# Patient Record
Sex: FEMALE | Race: WHITE
[De-identification: names, ages, dates, MRNs, and addresses within clinical notes are randomized per-mention and may not be internally consistent; named-entity substitution may affect disease eponyms.]

---

## 2017-03-09 ENCOUNTER — HOSPITAL ENCOUNTER (OUTPATIENT)
Dept: HOSPITAL 23 - SRAD | Age: 47
Discharge: HOME | End: 2017-03-09
Payer: COMMERCIAL

## 2017-03-09 DIAGNOSIS — M25.551: Primary | ICD-10-CM

## 2021-06-01 ENCOUNTER — OFFICE VISIT (OUTPATIENT)
Dept: NEUROLOGY | Facility: CLINIC | Age: 51
End: 2021-06-01

## 2021-06-01 VITALS
HEIGHT: 62 IN | SYSTOLIC BLOOD PRESSURE: 136 MMHG | HEART RATE: 90 BPM | OXYGEN SATURATION: 98 % | DIASTOLIC BLOOD PRESSURE: 84 MMHG

## 2021-06-01 DIAGNOSIS — R42 DIZZINESS: Primary | ICD-10-CM

## 2021-06-01 PROCEDURE — 99204 OFFICE O/P NEW MOD 45 MIN: CPT | Performed by: PSYCHIATRY & NEUROLOGY

## 2021-06-01 RX ORDER — PANTOPRAZOLE SODIUM 40 MG/1
40 TABLET, DELAYED RELEASE ORAL DAILY
COMMUNITY
Start: 2021-05-18 | End: 2022-12-06

## 2021-06-01 RX ORDER — TRIAMTERENE AND HYDROCHLOROTHIAZIDE 37.5; 25 MG/1; MG/1
1 CAPSULE ORAL DAILY
COMMUNITY
Start: 2021-05-24

## 2021-06-01 RX ORDER — VENLAFAXINE HYDROCHLORIDE 150 MG/1
225 CAPSULE, EXTENDED RELEASE ORAL DAILY
COMMUNITY
Start: 2021-05-24

## 2021-06-01 RX ORDER — MECLIZINE HYDROCHLORIDE 25 MG/1
TABLET ORAL
COMMUNITY
Start: 2021-05-07 | End: 2022-12-06

## 2021-06-01 RX ORDER — PHENAZOPYRIDINE HYDROCHLORIDE 200 MG/1
TABLET, FILM COATED ORAL
COMMUNITY
Start: 2021-04-29 | End: 2022-12-06

## 2021-06-01 RX ORDER — AMLODIPINE BESYLATE AND BENAZEPRIL HYDROCHLORIDE 10; 20 MG/1; MG/1
1 CAPSULE ORAL DAILY
COMMUNITY
Start: 2021-05-24

## 2021-06-01 RX ORDER — ERGOCALCIFEROL 1.25 MG/1
50000 CAPSULE ORAL WEEKLY
COMMUNITY
Start: 2021-04-21 | End: 2023-04-03

## 2021-06-01 RX ORDER — FAMOTIDINE 40 MG/1
40 TABLET, FILM COATED ORAL 2 TIMES DAILY
COMMUNITY
Start: 2021-04-12 | End: 2022-12-06

## 2021-06-01 RX ORDER — ESTRADIOL AND NORETHINDRONE ACETATE .5; .1 MG/1; MG/1
1 TABLET ORAL DAILY
COMMUNITY
Start: 2021-03-03

## 2021-06-01 RX ORDER — BACLOFEN 10 MG/1
TABLET ORAL
COMMUNITY
Start: 2021-04-21

## 2021-06-01 RX ORDER — ATORVASTATIN CALCIUM 10 MG/1
10 TABLET, FILM COATED ORAL
COMMUNITY
Start: 2021-04-21

## 2021-06-01 RX ORDER — OXYBUTYNIN CHLORIDE 5 MG/1
5 TABLET ORAL 2 TIMES DAILY
COMMUNITY
Start: 2021-05-26 | End: 2022-12-06

## 2021-06-01 NOTE — PROGRESS NOTES
"Chief Complaint   Patient presents with   • Dizziness       Patient ID: Mary Early is a 50 y.o. female.    HPI: I had the pleasure of seeing your patient today.  As you may know she is a 50-year-old female being seen for history of dizziness.  The patient says that she experiencing issues with dizziness couple of months ago.  She says that she was at work at that time and began to experience \"cold chills\".  She said that she felt \"spacey\".  She said that she was going dizzy and off balance.  She noticed that she sway when she walked.  She says that her symptoms with come and go.  She had no loss of vision or double vision.  Her symptoms did appear to worsen with changes in position.  She says that her symptoms seem to worsen if she were lying flat.  She states that she saw her primary care physician who prescribed her meclizine.  She says that this was not very helpful.  She says that sitting down will help her symptoms.  She denies any history of head injuries.  No focal weakness or numbness of her arms or legs.  No double vision or loss of vision.  No slurred speech or trouble swallowing.    The following portions of the patient's history were reviewed and updated as appropriate: allergies, current medications, past family history, past medical history, past social history, past surgical history and problem list.    Review of Systems   Constitutional: Negative for activity change, appetite change and fatigue.   HENT: Negative for tinnitus, trouble swallowing and voice change.    Eyes: Negative for photophobia, pain and visual disturbance.   Respiratory: Negative for chest tightness, shortness of breath and wheezing.    Cardiovascular: Negative for chest pain, palpitations and leg swelling.   Gastrointestinal: Negative for abdominal pain, nausea and vomiting.   Endocrine: Negative for cold intolerance, heat intolerance and polydipsia.   Musculoskeletal: Positive for gait problem (no falls, but balance " issues. ). Negative for back pain and neck pain.   Skin: Negative for color change, rash and wound.   Allergic/Immunologic: Negative for environmental allergies, food allergies and immunocompromised state.   Neurological: Positive for dizziness and light-headedness. Negative for tremors, seizures, syncope, facial asymmetry, speech difficulty, weakness, numbness and headaches.   Hematological: Negative for adenopathy. Does not bruise/bleed easily.   Psychiatric/Behavioral: Negative for agitation, behavioral problems, confusion, decreased concentration, dysphoric mood, hallucinations, self-injury, sleep disturbance and suicidal ideas. The patient is not nervous/anxious and is not hyperactive.       I have reviewed the review of systems above performed by my medical assistant.      Vitals:    06/01/21 1431   BP: 136/84   Pulse: 90   SpO2: 98%       Neurologic Exam     Mental Status   Oriented to person, place, and time.   Registration: recalls 3 of 3 objects. Follows 3 step commands.   Attention: normal. Concentration: normal.   Speech: speech is normal   Level of consciousness: alert  Knowledge: consistent with education (No deficits found.).   Normal comprehension.     Cranial Nerves     CN II   Visual fields full to confrontation.     CN III, IV, VI   Pupils are equal, round, and reactive to light.  Extraocular motions are normal.   CN III: no CN III palsy  CN VI: no CN VI palsy  Nystagmus: none   Diplopia: none    CN V   Facial sensation intact.     CN VII   Facial expression full, symmetric.     CN VIII   CN VIII normal.     CN IX, X   CN IX normal.   CN X normal.     CN XI   CN XI normal.     CN XII   CN XII normal.     Motor Exam   Muscle bulk: normal  Right arm tone: normal  Left arm tone: normal  Right leg tone: normal  Left leg tone: normal    Strength   Right neck flexion: 5/5  Left neck flexion: 5/5  Right neck extension: 5/5  Left neck extension: 5/5  Right deltoid: 5/5  Left deltoid: 5/5  Right biceps:  5/5  Left biceps: 5/5  Right triceps: 5/5  Left triceps: 5/5  Right wrist flexion: 5/5  Left wrist flexion: 5/5  Right wrist extension: 5/5  Left wrist extension: 5/5  Right interossei: 5/5  Left interossei: 5/5  Right abdominals: 5/5  Left abdominals: 5/5  Right iliopsoas: 5/5  Left iliopsoas: 5/5  Right quadriceps: 5/5  Left quadriceps: 5/5  Right hamstrin/5  Left hamstrin/5  Right glutei: 5/5  Left glutei: 5/5  Right anterior tibial: 5/5  Left anterior tibial: 5/5  Right posterior tibial: 5/5  Left posterior tibial: 5/5  Right peroneal: 5/5  Left peroneal: 5/5  Right gastroc: 5/5  Left gastroc: 5/5    Sensory Exam   Light touch normal.   Vibration normal.   Proprioception normal.   Pinprick normal.     Gait, Coordination, and Reflexes     Gait  Gait: normal    Coordination   Romberg: negative    Tremor   Resting tremor: absent  Intention tremor: absent    Reflexes   Right brachioradialis: 2+  Left brachioradialis: 2+  Right biceps: 2+  Left biceps: 2+  Right triceps: 2+  Left triceps: 2+  Right patellar: 2+  Left patellar: 2+  Right achilles: 2+  Left achilles: 2+  Right : 2+  Left : 2+Station is normal.       Physical Exam  Vitals reviewed.   Constitutional:       General: She is not in acute distress.     Appearance: She is well-developed.   HENT:      Head: Normocephalic and atraumatic.   Eyes:      Extraocular Movements: EOM normal.      Pupils: Pupils are equal, round, and reactive to light.   Cardiovascular:      Rate and Rhythm: Normal rate and regular rhythm.      Heart sounds: Normal heart sounds.   Pulmonary:      Effort: Pulmonary effort is normal. No respiratory distress.      Breath sounds: Normal breath sounds.   Abdominal:      General: Bowel sounds are normal. There is no distension.      Palpations: Abdomen is soft.      Tenderness: There is no abdominal tenderness.   Musculoskeletal:         General: No deformity.      Cervical back: Normal range of motion.   Skin:     General:  Skin is warm.      Findings: No rash.   Neurological:      Mental Status: She is oriented to person, place, and time.      Coordination: Romberg Test normal.      Gait: Gait is intact.      Deep Tendon Reflexes:      Reflex Scores:       Tricep reflexes are 2+ on the right side and 2+ on the left side.       Bicep reflexes are 2+ on the right side and 2+ on the left side.       Brachioradialis reflexes are 2+ on the right side and 2+ on the left side.       Patellar reflexes are 2+ on the right side and 2+ on the left side.       Achilles reflexes are 2+ on the right side and 2+ on the left side.  Psychiatric:         Speech: Speech normal.         Judgment: Judgment normal.         Procedures    Assessment/Plan: We are going to refer her to [facility] for vestibular assessment.  I would also like to schedule an MRI of her brain as well as an MRA of her head and neck to rule out vertebrobasilar insufficiency.  Return to clinic after testing.       Diagnoses and all orders for this visit:    1. Dizziness (Primary)  -     MRI Brain With & Without Contrast; Future  -     MRI angiogram head w wo contrast; Future  -     MRI Angiogram Neck With & Without Contrast; Future           Gunner Bates II, MD

## 2021-07-08 ENCOUNTER — TELEPHONE (OUTPATIENT)
Dept: NEUROLOGY | Facility: CLINIC | Age: 51
End: 2021-07-08

## 2021-07-08 DIAGNOSIS — R42 DIZZINESS: Primary | ICD-10-CM

## 2021-07-08 NOTE — TELEPHONE ENCOUNTER
Caller: Mary Early    Relationship: Self    Best call back number: [phone]    What is the medical concern/diagnosis: VESTIBULAR ASSESSMENT    What specialty or service is being requested: HEARING SPECIALIST/AUDIOLOGIST    What is the provider, practice or medical service name: [facility]    What is the office phone number: [phone]    Any additional details: PT CALLED TO CONFIRM MRI'S WERE SCHEDULED FOR TOMORROW; VERIFIED YES. PT ALSO STATES SHE HADN'T HEARD ANYTHING REGARDING HER REFERRAL TO HEARING SPECIALIST. LOOKING IN PT'S CHARGE IT DOESN'T LOOK LIKE THIS REFERRAL HAS BEEN CREATED AND PT WOULD LIKE TO GO FORWARD WITH REFERRAL IN ORDER TO BE SCHEDULED ASAP.      PLEASE REVIEW AND ADVISE

## 2021-07-09 ENCOUNTER — HOSPITAL ENCOUNTER (OUTPATIENT)
Dept: MRI IMAGING | Facility: HOSPITAL | Age: 51
Discharge: HOME | End: 2021-07-09

## 2021-07-09 ENCOUNTER — APPOINTMENT (OUTPATIENT)
Dept: MRI IMAGING | Facility: HOSPITAL | Age: 51
End: 2021-07-09

## 2021-07-09 DIAGNOSIS — R42 DIZZINESS: ICD-10-CM

## 2021-07-09 PROCEDURE — 70544 MR ANGIOGRAPHY HEAD W/O DYE: CPT

## 2021-07-09 PROCEDURE — 70547 MR ANGIOGRAPHY NECK W/O DYE: CPT

## 2021-07-09 PROCEDURE — A9577 INJ MULTIHANCE: HCPCS | Performed by: PSYCHIATRY & NEUROLOGY

## 2021-07-09 PROCEDURE — 0 GADOBENATE DIMEGLUMINE 529 MG/ML SOLUTION: Performed by: PSYCHIATRY & NEUROLOGY

## 2021-07-09 PROCEDURE — 70553 MRI BRAIN STEM W/O & W/DYE: CPT

## 2021-07-09 RX ADMIN — GADOBENATE DIMEGLUMINE 20 ML: 529 INJECTION, SOLUTION INTRAVENOUS at 15:55

## 2022-07-24 ENCOUNTER — INPATIENT HOSPITAL (AMBULATORY)
Dept: URBAN - METROPOLITAN AREA HOSPITAL 107 | Facility: HOSPITAL | Age: 52
End: 2022-07-24

## 2022-07-24 DIAGNOSIS — K21.9 GASTRO-ESOPHAGEAL REFLUX DISEASE WITHOUT ESOPHAGITIS: ICD-10-CM

## 2022-07-24 DIAGNOSIS — R11.2 NAUSEA WITH VOMITING, UNSPECIFIED: ICD-10-CM

## 2022-07-24 DIAGNOSIS — R10.9 UNSPECIFIED ABDOMINAL PAIN: ICD-10-CM

## 2022-07-24 DIAGNOSIS — R50.9 FEVER, UNSPECIFIED: ICD-10-CM

## 2022-07-24 DIAGNOSIS — J18.9 PNEUMONIA, UNSPECIFIED ORGANISM: ICD-10-CM

## 2022-07-24 DIAGNOSIS — R93.3 ABNORMAL FINDINGS ON DIAGNOSTIC IMAGING OF OTHER PARTS OF DI: ICD-10-CM

## 2022-07-24 PROCEDURE — 99223 1ST HOSP IP/OBS HIGH 75: CPT | Performed by: INTERNAL MEDICINE

## 2022-07-25 ENCOUNTER — INPATIENT HOSPITAL (AMBULATORY)
Dept: URBAN - METROPOLITAN AREA HOSPITAL 107 | Facility: HOSPITAL | Age: 52
End: 2022-07-25

## 2022-07-25 DIAGNOSIS — K21.9 GASTRO-ESOPHAGEAL REFLUX DISEASE WITHOUT ESOPHAGITIS: ICD-10-CM

## 2022-07-25 DIAGNOSIS — J18.9 PNEUMONIA, UNSPECIFIED ORGANISM: ICD-10-CM

## 2022-07-25 DIAGNOSIS — R93.3 ABNORMAL FINDINGS ON DIAGNOSTIC IMAGING OF OTHER PARTS OF DI: ICD-10-CM

## 2022-07-25 DIAGNOSIS — R11.2 NAUSEA WITH VOMITING, UNSPECIFIED: ICD-10-CM

## 2022-07-25 PROCEDURE — 99232 SBSQ HOSP IP/OBS MODERATE 35: CPT | Performed by: NURSE PRACTITIONER

## 2022-07-26 ENCOUNTER — INPATIENT HOSPITAL (AMBULATORY)
Dept: URBAN - METROPOLITAN AREA HOSPITAL 107 | Facility: HOSPITAL | Age: 52
End: 2022-07-26

## 2022-07-26 DIAGNOSIS — J18.9 PNEUMONIA, UNSPECIFIED ORGANISM: ICD-10-CM

## 2022-07-26 DIAGNOSIS — R93.3 ABNORMAL FINDINGS ON DIAGNOSTIC IMAGING OF OTHER PARTS OF DI: ICD-10-CM

## 2022-07-26 DIAGNOSIS — R11.10 VOMITING, UNSPECIFIED: ICD-10-CM

## 2022-07-26 DIAGNOSIS — K21.9 GASTRO-ESOPHAGEAL REFLUX DISEASE WITHOUT ESOPHAGITIS: ICD-10-CM

## 2022-07-26 PROCEDURE — 99231 SBSQ HOSP IP/OBS SF/LOW 25: CPT | Performed by: INTERNAL MEDICINE

## 2022-08-30 VITALS
RESPIRATION RATE: 26 BRPM | HEART RATE: 70 BPM | OXYGEN SATURATION: 98 % | TEMPERATURE: 97.9 F | HEART RATE: 77 BPM | SYSTOLIC BLOOD PRESSURE: 102 MMHG | RESPIRATION RATE: 19 BRPM | WEIGHT: 224 LBS | OXYGEN SATURATION: 100 % | HEART RATE: 92 BPM | DIASTOLIC BLOOD PRESSURE: 79 MMHG | OXYGEN SATURATION: 99 % | DIASTOLIC BLOOD PRESSURE: 62 MMHG | SYSTOLIC BLOOD PRESSURE: 104 MMHG | HEART RATE: 80 BPM | RESPIRATION RATE: 18 BRPM | DIASTOLIC BLOOD PRESSURE: 68 MMHG | SYSTOLIC BLOOD PRESSURE: 108 MMHG | DIASTOLIC BLOOD PRESSURE: 54 MMHG | SYSTOLIC BLOOD PRESSURE: 125 MMHG | RESPIRATION RATE: 16 BRPM | HEIGHT: 62 IN | HEART RATE: 76 BPM | SYSTOLIC BLOOD PRESSURE: 105 MMHG | RESPIRATION RATE: 17 BRPM | DIASTOLIC BLOOD PRESSURE: 63 MMHG | TEMPERATURE: 97.1 F | HEART RATE: 89 BPM

## 2022-08-31 PROBLEM — R11.2 NAUSEA WITH VOMITING: Status: ACTIVE | Noted: 2022-09-01

## 2022-08-31 PROBLEM — R11.0 NAUSEA: Status: ACTIVE | Noted: 2022-09-01

## 2022-08-31 PROBLEM — K21.9 GERD - GASTROESOPHAGEAL REFLUX DISEASE: Status: ACTIVE | Noted: 2022-09-01

## 2022-08-31 PROBLEM — R93.3 ABNORMAL FINDINGS ON DIAGNOSTIC IMAGING OF OTHER PARTS OF DIGESTIVE TRACT: Status: ACTIVE | Noted: 2022-09-01

## 2022-09-01 ENCOUNTER — OFFICE (AMBULATORY)
Dept: URBAN - METROPOLITAN AREA PATHOLOGY 4 | Facility: PATHOLOGY | Age: 52
End: 2022-09-01

## 2022-09-01 ENCOUNTER — AMBULATORY SURGICAL CENTER (AMBULATORY)
Dept: URBAN - METROPOLITAN AREA SURGERY 17 | Facility: SURGERY | Age: 52
End: 2022-09-01
Payer: COMMERCIAL

## 2022-09-01 DIAGNOSIS — R93.3 ABNORMAL FINDINGS ON DIAGNOSTIC IMAGING OF OTHER PARTS OF DI: ICD-10-CM

## 2022-09-01 DIAGNOSIS — T18.2XXA FOREIGN BODY IN STOMACH, INITIAL ENCOUNTER: ICD-10-CM

## 2022-09-01 DIAGNOSIS — K22.5 DIVERTICULUM OF ESOPHAGUS, ACQUIRED: ICD-10-CM

## 2022-09-01 DIAGNOSIS — K31.89 OTHER DISEASES OF STOMACH AND DUODENUM: ICD-10-CM

## 2022-09-01 DIAGNOSIS — K21.9 GASTRO-ESOPHAGEAL REFLUX DISEASE WITHOUT ESOPHAGITIS: ICD-10-CM

## 2022-09-01 DIAGNOSIS — K29.70 GASTRITIS, UNSPECIFIED, WITHOUT BLEEDING: ICD-10-CM

## 2022-09-01 DIAGNOSIS — R11.2 NAUSEA WITH VOMITING, UNSPECIFIED: ICD-10-CM

## 2022-09-01 PROBLEM — K22.89 OTHER SPECIFIED DISEASE OF ESOPHAGUS: Status: ACTIVE | Noted: 2022-09-01

## 2022-09-01 LAB
GI HISTOLOGY: A. SELECT: (no result)
GI HISTOLOGY: PDF REPORT: (no result)

## 2022-09-01 PROCEDURE — 88305 TISSUE EXAM BY PATHOLOGIST: CPT | Performed by: INTERNAL MEDICINE

## 2022-09-01 PROCEDURE — 43239 EGD BIOPSY SINGLE/MULTIPLE: CPT | Performed by: INTERNAL MEDICINE

## 2022-09-01 NOTE — SERVICEHPINOTES
51-year-old female with reflux, she was seen her in the hospital, she had bilateral pneumonia.  CT showed a dilated fluid-filled esophagus.  She's had previous lap band.  She presents for EGD.

## 2022-12-06 ENCOUNTER — OFFICE VISIT (OUTPATIENT)
Dept: CARDIOLOGY | Facility: CLINIC | Age: 52
End: 2022-12-06

## 2022-12-06 VITALS
HEART RATE: 99 BPM | DIASTOLIC BLOOD PRESSURE: 90 MMHG | SYSTOLIC BLOOD PRESSURE: 146 MMHG | WEIGHT: 224 LBS | HEIGHT: 62 IN | BODY MASS INDEX: 41.22 KG/M2

## 2022-12-06 DIAGNOSIS — I10 PRIMARY HYPERTENSION: ICD-10-CM

## 2022-12-06 DIAGNOSIS — I31.39 PERICARDIAL EFFUSION: Primary | ICD-10-CM

## 2022-12-06 DIAGNOSIS — R93.89 ABNORMAL CT OF THE CHEST: ICD-10-CM

## 2022-12-06 PROCEDURE — 99203 OFFICE O/P NEW LOW 30 MIN: CPT | Performed by: INTERNAL MEDICINE

## 2022-12-06 PROCEDURE — 93000 ELECTROCARDIOGRAM COMPLETE: CPT | Performed by: INTERNAL MEDICINE

## 2022-12-06 NOTE — PROGRESS NOTES
New Patient   Ref by Dr. Rosa MALAGON on CT    Subjective:        [facility]  Cardiology Consult Note    Patient Identification:  Name: Mary Early  Age: 52 y.o.  Sex: female  :  1970  MRN: [id number]             CC      52-year-old female here for the cardiac evaluation as well as establishment of the care as the patient recently had a CT of the chest which shows small pericardial effusion denies any chest pains or tightness in the chest    History of Present Illness:       Comorbid cardiac risk factors:     Past Medical History:  Past Medical History:   Diagnosis Date   • Anemia    • Arthritis    • Depression    • Headache, tension-type    • Hypertension      Past Surgical History:  Past Surgical History:   Procedure Laterality Date   • BREAST BIOPSY     •  SECTION      1986,1991,1994   • FOOT SURGERY Left    • GALLBLADDER SURGERY     • HYSTERECTOMY     • WRIST ARTHROPLASTY Bilateral       Allergies:  No Known Allergies  Home Meds:  (Not in a hospital admission)    Current Meds:     Current Outpatient Medications:   •  amLODIPine-benazepril (LOTREL 5-20) 5-20 MG per capsule, Take 1 capsule by mouth Daily., Disp: , Rfl:   •  atorvastatin (LIPITOR) 10 MG tablet, Take 10 mg by mouth every night at bedtime., Disp: , Rfl:   •  B Complex-C-E-Zn (b complex-C-E-zinc) tablet, Take 1 tablet by mouth Daily., Disp: , Rfl:   •  baclofen (LIORESAL) 10 MG tablet, TAKE 1 TABLET BY MOUTH THREE TIMES DAILY AS NEEDED FOR MUSCLE SPASMS. MAY CAUSE DROWSINESS, Disp: , Rfl:   •  Estradiol-Norethindrone Acet 0.5-0.1 MG per tablet, Take 1 tablet by mouth Daily., Disp: , Rfl:   •  triamterene-hydrochlorothiazide (DYAZIDE) 37.5-25 MG per capsule, Take 1 capsule by mouth Daily., Disp: , Rfl:   •  venlafaxine XR (EFFEXOR-XR) 150 MG 24 hr capsule, Take 150 mg by mouth Daily., Disp: , Rfl:   •  vitamin D (ERGOCALCIFEROL) 1.25 MG (97455 UT) capsule capsule, Take 50,000 Units by mouth 1  (One) Time Per Week., Disp: , Rfl:   Social History:   Social History     Tobacco Use   • Smoking status: Never   • Smokeless tobacco: Never   Substance Use Topics   • Alcohol use: Yes     Alcohol/week: 0.0 - 1.0 standard drinks      Family History:  Family History   Problem Relation Age of Onset   • Arthritis Mother    • Alcohol abuse Mother    • Heart disease Mother    • Hypertension Mother    • Hyperlipidemia Mother    • Obesity Mother    • Stroke Mother    • Thyroid disease Mother    • Alcohol abuse Father    • Hypertension Father         Review of Systems    Constitutional: No weakness,fatigue, fever, rigors, chills   Eyes: No vision changes, eye pain   ENT/oropharynx: No difficulty swallowing, sore throat, epistaxis, changes in hearing   Cardiovascular: No chest pain, chest tightness, palpitations, paroxysmal nocturnal dyspnea, orthopnea, diaphoresis, dizziness / syncopal episode   Respiratory: No shortness of breath, dyspnea on exertion, cough, wheezing hemoptysis   Gastrointestinal: No abdominal pain, nausea, vomiting, diarrhea, bloody stools   Genitourinary: No hematuria, dysuria   Neurological: No headache, tremors, numbness,  one-sided weakness    Musculoskeletal: No cramps, myalgias,  joint pain, joint swelling   Integument: No rash, edema           Constitutional:  Heart Rate:  [99] 99  BP: (146)/(90) 146/90    Physical Exam   General:  Appears in no acute distress  Eyes: PERTL,  HEENT:  No JVD. Thyroid not visibly enlarged. No mucosal pallor or cyanosis  Respiratory: Respirations regular and unlabored at rest. BBS with good air entry in all fields. No crackles, rubs or wheezes auscultated  Cardiovascular: S1S2 Regular rate and rhythm. No murmur, rub or gallop auscultated. No carotid bruits. DP/PT pulses    . No pretibial pitting edema  Gastrointestinal: Abdomen soft, flat, non tender. Bowel sounds present. No hepatosplenomegaly. No ascites  Musculoskeletal: CAMP x4. No abnormal movements  Extremities:  No digital clubbing or cyanosis  Skin: Color pink. Skin warm and dry to touch. No rashes  No xanthoma  Neuro: AAO x3 CN II-XII grossly intact            ECG 12 Lead    Date/Time: 12/6/2022 2:13 PM  Performed by: Betty Bentley MD  Authorized by: Betty Bentley MD   Comparison: not compared with previous ECG   Previous ECG: no previous ECG available  Rhythm: sinus rhythm    Clinical impression: non-specific ECG                Cardiographics  ECG:     Telemetry:    Echocardiogram:     Imaging  Chest X-ray:     Lab Review               @[email or web address]@              Assessment:/ Recommendations / Plan:   Patient Active Problem List   Diagnosis   • Pericardial effusion   • Abnormal CT of the chest                    ICD-10-CM ICD-9-CM   1. Pericardial effusion  I31.39 423.9   2. Abnormal CT of the chest  R93.89 793.2     1. Pericardial effusion  Considering patient's medical condition as well as the risk factors, patient will require echocardiogram for further evaluation for the LV function, four-chamber evaluation, including the pressures, valvular function and  pericardial disease and pericardial effusion      2. Abnormal CT of the chest  Considering the patient's symptoms as well as clinical situation and  EKG findings, along with cardiac risk factors, ischemic workup is necessary to rule out ischemic cardiomyopathy, stress induced arrhythmias, and functional capacity for diagnosis as well as prognostic consideration       Ct showed pericardial effusion  Htn, hyperlipidemia, obesity  F/h. High stress  Ett, echo      Labs/tests ordered for am      Betty Bentley MD  12/7/2022, 08:46 EST      EMR Dragon/Transcription:   Dictated utilizing Dragon dictation

## 2022-12-07 PROBLEM — I31.39 PERICARDIAL EFFUSION: Status: ACTIVE | Noted: 2022-12-07

## 2022-12-07 PROBLEM — R93.89 ABNORMAL CT OF THE CHEST: Status: ACTIVE | Noted: 2022-12-07

## 2022-12-20 ENCOUNTER — HOSPITAL ENCOUNTER (OUTPATIENT)
Dept: CARDIOLOGY | Facility: HOSPITAL | Age: 52
Discharge: HOME | End: 2022-12-20
Admitting: INTERNAL MEDICINE

## 2022-12-20 VITALS
DIASTOLIC BLOOD PRESSURE: 73 MMHG | HEIGHT: 62 IN | WEIGHT: 224 LBS | HEART RATE: 83 BPM | SYSTOLIC BLOOD PRESSURE: 115 MMHG | BODY MASS INDEX: 41.22 KG/M2

## 2022-12-20 PROCEDURE — 93306 TTE W/DOPPLER COMPLETE: CPT | Performed by: INTERNAL MEDICINE

## 2022-12-20 PROCEDURE — 93306 TTE W/DOPPLER COMPLETE: CPT

## 2022-12-21 LAB
BH CV ECHO MEAS - ACS: 1.94 CM
BH CV ECHO MEAS - AO MAX PG: 8.6 MMHG
BH CV ECHO MEAS - AO MEAN PG: 4.8 MMHG
BH CV ECHO MEAS - AO ROOT DIAM: 2.26 CM
BH CV ECHO MEAS - AO V2 MAX: 146.6 CM/SEC
BH CV ECHO MEAS - AO V2 VTI: 28.6 CM
BH CV ECHO MEAS - AVA(I,D): 2.3 CM2
BH CV ECHO MEAS - EDV(CUBED): 69.5 ML
BH CV ECHO MEAS - EDV(MOD-SP2): 41 ML
BH CV ECHO MEAS - EDV(MOD-SP4): 71 ML
BH CV ECHO MEAS - EF(MOD-BP): 66.4 %
BH CV ECHO MEAS - EF(MOD-SP2): 63.4 %
BH CV ECHO MEAS - EF(MOD-SP4): 67.6 %
BH CV ECHO MEAS - ESV(CUBED): 21.8 ML
BH CV ECHO MEAS - ESV(MOD-SP2): 15 ML
BH CV ECHO MEAS - ESV(MOD-SP4): 23 ML
BH CV ECHO MEAS - FS: 32 %
BH CV ECHO MEAS - IVS/LVPW: 1.17 CM
BH CV ECHO MEAS - IVSD: 1.37 CM
BH CV ECHO MEAS - LA DIMENSION: 3.3 CM
BH CV ECHO MEAS - LAT PEAK E' VEL: 7.3 CM/SEC
BH CV ECHO MEAS - LV DIASTOLIC VOL/BSA (35-75): 35.4 CM2
BH CV ECHO MEAS - LV MASS(C)D: 188.1 GRAMS
BH CV ECHO MEAS - LV MAX PG: 4.7 MMHG
BH CV ECHO MEAS - LV MEAN PG: 1.89 MMHG
BH CV ECHO MEAS - LV SYSTOLIC VOL/BSA (12-30): 11.5 CM2
BH CV ECHO MEAS - LV V1 MAX: 108.2 CM/SEC
BH CV ECHO MEAS - LV V1 VTI: 20.7 CM
BH CV ECHO MEAS - LVIDD: 4.1 CM
BH CV ECHO MEAS - LVIDS: 2.8 CM
BH CV ECHO MEAS - LVOT AREA: 3.2 CM2
BH CV ECHO MEAS - LVOT DIAM: 2.02 CM
BH CV ECHO MEAS - LVPWD: 1.17 CM
BH CV ECHO MEAS - MED PEAK E' VEL: 8.7 CM/SEC
BH CV ECHO MEAS - MV A DUR: 0.1 SEC
BH CV ECHO MEAS - MV A MAX VEL: 59.2 CM/SEC
BH CV ECHO MEAS - MV DEC SLOPE: 251.8 CM/SEC2
BH CV ECHO MEAS - MV DEC TIME: 219 MSEC
BH CV ECHO MEAS - MV E MAX VEL: 62.1 CM/SEC
BH CV ECHO MEAS - MV E/A: 1.05
BH CV ECHO MEAS - MV MAX PG: 2.48 MMHG
BH CV ECHO MEAS - MV MEAN PG: 1.69 MMHG
BH CV ECHO MEAS - MV P1/2T: 52.1 MSEC
BH CV ECHO MEAS - MV V2 VTI: 22.7 CM
BH CV ECHO MEAS - MVA(P1/2T): 4.2 CM2
BH CV ECHO MEAS - MVA(VTI): 2.9 CM2
BH CV ECHO MEAS - PA ACC TIME: 0.08 SEC
BH CV ECHO MEAS - PA PR(ACCEL): 44.1 MMHG
BH CV ECHO MEAS - PA V2 MAX: 127.6 CM/SEC
BH CV ECHO MEAS - PI END-D VEL: 104.5 CM/SEC
BH CV ECHO MEAS - PULM A REVS VEL: 33.3 CM/SEC
BH CV ECHO MEAS - PULM DIAS VEL: 44.8 CM/SEC
BH CV ECHO MEAS - PULM S/D: 1.37
BH CV ECHO MEAS - PULM SYS VEL: 61.4 CM/SEC
BH CV ECHO MEAS - QP/QS: 1.49
BH CV ECHO MEAS - RV MAX PG: 3.5 MMHG
BH CV ECHO MEAS - RV V1 MAX: 93.6 CM/SEC
BH CV ECHO MEAS - RV V1 VTI: 17.6 CM
BH CV ECHO MEAS - RVDD: 3.2 CM
BH CV ECHO MEAS - RVOT DIAM: 2.7 CM
BH CV ECHO MEAS - SI(MOD-SP2): 13 ML/M2
BH CV ECHO MEAS - SI(MOD-SP4): 23.9 ML/M2
BH CV ECHO MEAS - SV(LVOT): 65.9 ML
BH CV ECHO MEAS - SV(MOD-SP2): 26 ML
BH CV ECHO MEAS - SV(MOD-SP4): 48 ML
BH CV ECHO MEAS - SV(RVOT): 98.1 ML
BH CV ECHO MEAS - TAPSE (>1.6): 2.32 CM
BH CV ECHO MEASUREMENTS AVERAGE E/E' RATIO: 7.76
BH CV XLRA - TDI S': 18.7 CM/SEC
MAXIMAL PREDICTED HEART RATE: 166 BPM
SINUS: 2.8 CM
STJ: 3 CM
STRESS TARGET HR: 141 BPM

## 2022-12-29 ENCOUNTER — HOSPITAL ENCOUNTER (OUTPATIENT)
Dept: CARDIOLOGY | Facility: HOSPITAL | Age: 52
Discharge: HOME | End: 2022-12-29
Admitting: INTERNAL MEDICINE

## 2022-12-29 VITALS — HEART RATE: 97 BPM | SYSTOLIC BLOOD PRESSURE: 110 MMHG | DIASTOLIC BLOOD PRESSURE: 60 MMHG

## 2022-12-29 LAB
BH CV STRESS BP STAGE 1: NORMAL
BH CV STRESS DURATION MIN STAGE 1: 3
BH CV STRESS DURATION MIN STAGE 2: 0
BH CV STRESS DURATION SEC STAGE 1: 0
BH CV STRESS DURATION SEC STAGE 2: 53
BH CV STRESS GRADE STAGE 1: 10
BH CV STRESS GRADE STAGE 2: 12
BH CV STRESS HR STAGE 1: 139
BH CV STRESS HR STAGE 2: 145
BH CV STRESS METS STAGE 1: 4.6
BH CV STRESS METS STAGE 2: 5.2
BH CV STRESS PROTOCOL 1: NORMAL
BH CV STRESS RECOVERY BP: NORMAL MMHG
BH CV STRESS RECOVERY HR: 106 BPM
BH CV STRESS SPEED STAGE 1: 1.7
BH CV STRESS SPEED STAGE 2: 2.5
BH CV STRESS STAGE 1: 1
BH CV STRESS STAGE 2: 2
MAXIMAL PREDICTED HEART RATE: 166 BPM
PERCENT MAX PREDICTED HR: 87.35 %
STRESS BASELINE BP: NORMAL MMHG
STRESS BASELINE HR: 98 BPM
STRESS PERCENT HR: 103 %
STRESS POST ESTIMATED WORKLOAD: 5.4 METS
STRESS POST EXERCISE DUR MIN: 3 MIN
STRESS POST EXERCISE DUR SEC: 53 SEC
STRESS POST PEAK BP: NORMAL MMHG
STRESS POST PEAK HR: 145 BPM
STRESS TARGET HR: 141 BPM

## 2022-12-29 PROCEDURE — 93018 CV STRESS TEST I&R ONLY: CPT | Performed by: INTERNAL MEDICINE

## 2022-12-29 PROCEDURE — 93017 CV STRESS TEST TRACING ONLY: CPT

## 2023-02-20 ENCOUNTER — OFFICE VISIT (OUTPATIENT)
Dept: CARDIOLOGY | Facility: CLINIC | Age: 53
End: 2023-02-20
Payer: COMMERCIAL

## 2023-02-20 VITALS
WEIGHT: 234 LBS | BODY MASS INDEX: 43.06 KG/M2 | SYSTOLIC BLOOD PRESSURE: 120 MMHG | HEART RATE: 88 BPM | HEIGHT: 62 IN | DIASTOLIC BLOOD PRESSURE: 74 MMHG

## 2023-02-20 DIAGNOSIS — I10 PRIMARY HYPERTENSION: ICD-10-CM

## 2023-02-20 DIAGNOSIS — I31.39 PERICARDIAL EFFUSION: Primary | ICD-10-CM

## 2023-02-20 PROCEDURE — 99213 OFFICE O/P EST LOW 20 MIN: CPT | Performed by: INTERNAL MEDICINE

## 2023-02-20 RX ORDER — HYDROCODONE BITARTRATE AND ACETAMINOPHEN 5; 325 MG/1; MG/1
1 TABLET ORAL EVERY 6 HOURS PRN
COMMUNITY

## 2023-02-20 NOTE — PROGRESS NOTES
"Stress test and echo results    Subjective:        Mary Early is a 52 y.o. female who here for follow up    CC  Follow-up fusion hypertension  HPI  52-year-old female with a hypertension pericardial effusion here for the follow-up with no complaints of  tightness in the chest     Problems Addressed this Visit        Cardiac and Vasculature    Pericardial effusion - Primary    Primary hypertension   Diagnoses       Codes Comments    Pericardial effusion    -  Primary ICD-10-CM: I31.39  ICD-9-CM: 423.9     Primary hypertension     ICD-10-CM: I10  ICD-9-CM: 401.9         .    The following portions of the patient's history were reviewed and updated as appropriate: allergies, current medications, past family history, past medical history, past social history, past surgical history and problem list.    Past Medical History:   Diagnosis Date   • Anemia    • Arthritis    • Depression    • Headache, tension-type    • Hypertension      reports that she has never smoked. She has never used smokeless tobacco. She reports current alcohol use. She reports that she does not use drugs.   Family History   Problem Relation Age of Onset   • Arthritis Mother    • Alcohol abuse Mother    • Heart disease Mother    • Hypertension Mother    • Hyperlipidemia Mother    • Obesity Mother    • Stroke Mother    • Thyroid disease Mother    • Alcohol abuse Father    • Hypertension Father        Review of Systems  Constitutional: No wt loss, fever, fatigue  Gastrointestinal: No nausea, abdominal pain  Behavioral/Psych: No insomnia or anxiety   Cardiovascular no chest pains or tightness in the chest  Objective:       Physical Exam           Physical Exam  /74   Pulse 88   Ht 157.5 cm (62\")   Wt 106 kg (234 lb)   BMI 42.80 kg/m²     General appearance: No acute changes   Eyes: Sclerae conjunctivae normal, pupils reactive   HENT: Atraumatic; oropharynx clear with moist mucous membranes and no mucosal ulcerations;  Neck: Trachea " midline; NECK, supple, no thyromegaly or lymphadenopathy   Lungs: Normal size and shape, normal breath sounds, equal distribution of air, no rales and rhonchi   CV: S1-S2 regular, no murmurs, no rub, no gallop   Abdomen: Soft, nontender; no masses , no abnormal abdominal sounds   Extremities: No deformity , normal color , no peripheral edema   Skin: Normal temperature, turgor and texture; no rash, ulcers  Psych: Appropriate affect, alert and oriented to person, place and time           Procedures  No ECG evidence of myocardial ischemia. Negative clinical evidence of myocardial ischemia. Findings consistent with a normal ECG stress test. Asymptomatic for chest pain. ECG is negative for ischemia.    Interpretation Summary       •  Left ventricular ejection fraction appears to be 66 - 70%.  •  Left ventricular diastolic function was normal.    Echocardiogram:        Current Outpatient Medications:   •  amLODIPine-benazepril (LOTREL 5-20) 5-20 MG per capsule, Take 1 capsule by mouth Daily., Disp: , Rfl:   •  atorvastatin (LIPITOR) 10 MG tablet, Take 10 mg by mouth every night at bedtime., Disp: , Rfl:   •  B Complex-C-E-Zn (b complex-C-E-zinc) tablet, Take 1 tablet by mouth Daily., Disp: , Rfl:   •  baclofen (LIORESAL) 10 MG tablet, TAKE 1 TABLET BY MOUTH THREE TIMES DAILY AS NEEDED FOR MUSCLE SPASMS. MAY CAUSE DROWSINESS, Disp: , Rfl:   •  Estradiol-Norethindrone Acet 0.5-0.1 MG per tablet, Take 1 tablet by mouth Daily., Disp: , Rfl:   •  HYDROcodone-acetaminophen (NORCO) 5-325 MG per tablet, Take 1 tablet by mouth Every 6 (Six) Hours As Needed., Disp: , Rfl:   •  triamterene-hydrochlorothiazide (DYAZIDE) 37.5-25 MG per capsule, Take 1 capsule by mouth Daily., Disp: , Rfl:   •  venlafaxine XR (EFFEXOR-XR) 150 MG 24 hr capsule, Take 150 mg by mouth Daily., Disp: , Rfl:   •  vitamin D (ERGOCALCIFEROL) 1.25 MG (32278 UT) capsule capsule, Take 50,000 Units by mouth 1 (One) Time Per Week., Disp: , Rfl:    Assessment:         Patient Active Problem List   Diagnosis   • Pericardial effusion   • Abnormal CT of the chest   • Primary hypertension               Plan:               ICD-10-CM ICD-9-CM   1. Pericardial effusion  I31.39 423.9   2. Primary hypertension  I10 401.9     1. Pericardial effusion  Control    2. Primary hypertension  Continue current treatment     Specificity and sensitivity of the stress test/ cardiac workup has been explained. Pt has been explained if  Symptoms continue please go to ER, and further w/p will be required.    Also explained this does not rule out coronary artery disease or the future events, continue to emphasize on risk reductions for coronary artery disease    Pt also advised to contact PCP for other causes of symptoms    1 YR    COUNSELING:    Mary Dozier was given to patient for the following topics: diagnostic results, risk factor reductions, impressions, risks and benefits of treatment options and importance of treatment compliance .       SMOKING COUNSELING:        Dictated using Dragon dictation

## 2023-02-21 PROBLEM — I10 PRIMARY HYPERTENSION: Status: ACTIVE | Noted: 2023-02-21

## 2023-03-29 ENCOUNTER — TELEPHONE (OUTPATIENT)
Dept: ORTHOPEDIC SURGERY | Facility: HOSPITAL | Age: 53
End: 2023-03-29
Payer: COMMERCIAL

## 2023-03-29 NOTE — TELEPHONE ENCOUNTER
Risk Factor yes no   Age >75  X   BMI <20 >40 X    Patient History     Chronic Pain (2 or more meds/Pain Management)  X   ETOH (more than 3 drinks Daily)  X   Uncontrolled Depression/Bipolar/Schizoaffective Disorder  X   Arrhythmias  X   Stent placement/MI  X   DVT/PE  X   Pacemaker  X   HTN (uncontrolled or requiring more than 2 medications)  X   CHF/Retained fluids/Edema  X   Stroke with Residual   X   COPD/Asthma  X   JAGJIT--Non-compliant with CPAP  X   Diabetes (on insulin or more than 2 meds)         A1C:  X   BPH/Urinary retention (on medication)  X   CKD  X   Home environment and support     Current ambulation status (use of cane, walker, W/C, Multiple falls/weakness)  X   Stairs to enter and throughout home X    Lives Alone  X   Doesn’t have support at home  X     Outpatient Screening Assessment    Home needs: (Walker/BSC):  Total Shoulder  ? Steps 3 steps   Caregiver 24-48hrs post-discharge:      Discharge Plan:  Total Shoulder     Prescriptions: Meds to bed    Home medications:   ? Blood thinner/anti-coag therapy--   ? BPH or diuretic  ? BP meds--Lotrel, Dyazide  ? Pain/Anti-inflammatories--  Pre-op Education:  Educate patient on spinal anesthesia/pain control:  ? patient verbalize understanding    Educate patient on hospital course/timeline:  ?  patient verbalize understanding    Joint Care Class:  ?  yes ? no  Notes:   [facility] 4/3 @ 1100

## 2023-04-03 ENCOUNTER — PRE-ADMISSION TESTING (OUTPATIENT)
Dept: PREADMISSION TESTING | Facility: HOSPITAL | Age: 53
End: 2023-04-03
Payer: COMMERCIAL

## 2023-04-03 ENCOUNTER — HOSPITAL ENCOUNTER (OUTPATIENT)
Dept: GENERAL RADIOLOGY | Facility: HOSPITAL | Age: 53
Discharge: HOME | End: 2023-04-03
Payer: COMMERCIAL

## 2023-04-03 VITALS
HEART RATE: 95 BPM | OXYGEN SATURATION: 98 % | HEIGHT: 62 IN | WEIGHT: 244.8 LBS | TEMPERATURE: 98 F | BODY MASS INDEX: 45.05 KG/M2 | RESPIRATION RATE: 16 BRPM | SYSTOLIC BLOOD PRESSURE: 116 MMHG | DIASTOLIC BLOOD PRESSURE: 56 MMHG

## 2023-04-03 LAB
ALBUMIN SERPL-MCNC: 3.5 G/DL (ref 3.5–5.2)
ALBUMIN/GLOB SERPL: 1.2 G/DL
ALP SERPL-CCNC: 102 U/L (ref 39–117)
ALT SERPL W P-5'-P-CCNC: 16 U/L (ref 1–33)
ANION GAP SERPL CALCULATED.3IONS-SCNC: 10.3 MMOL/L (ref 5–15)
AST SERPL-CCNC: 18 U/L (ref 1–32)
BASOPHILS # BLD AUTO: 0.05 10*3/MM3 (ref 0–0.2)
BASOPHILS NFR BLD AUTO: 0.5 % (ref 0–1.5)
BILIRUB SERPL-MCNC: 0.2 MG/DL (ref 0–1.2)
BUN SERPL-MCNC: 15 MG/DL (ref 6–20)
BUN/CREAT SERPL: 18.8 (ref 7–25)
CALCIUM SPEC-SCNC: 9 MG/DL (ref 8.6–10.5)
CHLORIDE SERPL-SCNC: 106 MMOL/L (ref 98–107)
CO2 SERPL-SCNC: 24.7 MMOL/L (ref 22–29)
CREAT SERPL-MCNC: 0.8 MG/DL (ref 0.57–1)
DEPRECATED RDW RBC AUTO: 41 FL (ref 37–54)
EGFRCR SERPLBLD CKD-EPI 2021: 87.7 ML/MIN/1.73
EOSINOPHIL # BLD AUTO: 0.21 10*3/MM3 (ref 0–0.4)
EOSINOPHIL NFR BLD AUTO: 2.2 % (ref 0.3–6.2)
ERYTHROCYTE [DISTWIDTH] IN BLOOD BY AUTOMATED COUNT: 14.4 % (ref 12.3–15.4)
GLOBULIN UR ELPH-MCNC: 2.9 GM/DL
GLUCOSE SERPL-MCNC: 128 MG/DL (ref 65–99)
HCT VFR BLD AUTO: 33.9 % (ref 34–46.6)
HGB BLD-MCNC: 10.8 G/DL (ref 12–15.9)
IMM GRANULOCYTES # BLD AUTO: 0.04 10*3/MM3 (ref 0–0.05)
IMM GRANULOCYTES NFR BLD AUTO: 0.4 % (ref 0–0.5)
LYMPHOCYTES # BLD AUTO: 1.54 10*3/MM3 (ref 0.7–3.1)
LYMPHOCYTES NFR BLD AUTO: 15.8 % (ref 19.6–45.3)
MCH RBC QN AUTO: 25.1 PG (ref 26.6–33)
MCHC RBC AUTO-ENTMCNC: 31.9 G/DL (ref 31.5–35.7)
MCV RBC AUTO: 78.8 FL (ref 79–97)
MONOCYTES # BLD AUTO: 0.63 10*3/MM3 (ref 0.1–0.9)
MONOCYTES NFR BLD AUTO: 6.5 % (ref 5–12)
NEUTROPHILS NFR BLD AUTO: 7.29 10*3/MM3 (ref 1.7–7)
NEUTROPHILS NFR BLD AUTO: 74.6 % (ref 42.7–76)
NRBC BLD AUTO-RTO: 0 /100 WBC (ref 0–0.2)
PLATELET # BLD AUTO: 345 10*3/MM3 (ref 140–450)
PMV BLD AUTO: 9.3 FL (ref 6–12)
POTASSIUM SERPL-SCNC: 3.7 MMOL/L (ref 3.5–5.2)
PROT SERPL-MCNC: 6.4 G/DL (ref 6–8.5)
RBC # BLD AUTO: 4.3 10*6/MM3 (ref 3.77–5.28)
SODIUM SERPL-SCNC: 141 MMOL/L (ref 136–145)
WBC NRBC COR # BLD: 9.76 10*3/MM3 (ref 3.4–10.8)

## 2023-04-03 PROCEDURE — 71046 X-RAY EXAM CHEST 2 VIEWS: CPT

## 2023-04-03 PROCEDURE — 80053 COMPREHEN METABOLIC PANEL: CPT

## 2023-04-03 PROCEDURE — 36415 COLL VENOUS BLD VENIPUNCTURE: CPT

## 2023-04-03 PROCEDURE — 85025 COMPLETE CBC W/AUTO DIFF WBC: CPT

## 2023-04-03 RX ORDER — MELATONIN
1000 DAILY
COMMUNITY

## 2023-04-03 NOTE — DISCHARGE INSTRUCTIONS
Take the following medications the morning of surgery:    NONE    ARRIVE AT 0730.    If you are on prescription narcotic pain medication to control your pain you may also take that medication the morning of surgery.    General Instructions:  Do not eat solid food after midnight the night before surgery.  You may drink clear liquids day of surgery but must stop at least one hour before your hospital arrival time.  It is beneficial for you to have a clear drink that contains carbohydrates the day of surgery.  We suggest a 12 to 20 ounce bottle of Gatorade or Powerade for non-diabetic patients or a 12 to 20 ounce bottle of G2 or Powerade Zero for diabetic patients. (Pediatric patients, are not advised to drink a 12 to 20 ounce carbohydrate drink)    Clear liquids are liquids you can see through.  Nothing red in color.     Plain water                               Sports drinks  Sodas                                   Gelatin (Jell-O)  Fruit juices without pulp such as white grape juice and apple juice  Popsicles that contain no fruit or yogurt  Tea or coffee (no cream or milk added)  Gatorade / Powerade  G2 / Powerade Zero    Infants may have breast milk up to four hours before surgery.  Infants drinking formula may drink formula up to six hours before surgery.   Patients who avoid smoking, chewing tobacco and alcohol for 4 weeks prior to surgery have a reduced risk of post-operative complications.  Quit smoking as many days before surgery as you can.  Do not smoke, use chewing tobacco or drink alcohol the day of surgery.   If applicable bring your C-PAP/ BI-PAP machine.  Bring any papers given to you in the doctor’s office.  Wear clean comfortable clothes.  Do not wear contact lenses, false eyelashes or make-up.  Bring a case for your glasses.   Bring crutches or walker if applicable.  Remove all piercings.  Leave jewelry and any other valuables at home.  Hair extensions with metal clips must be removed prior to  surgery.  The Pre-Admission Testing nurse will instruct you to bring medications if unable to obtain an accurate list in Pre-Admission Testing.        If you were given a blood bank ID arm band remember to bring it with you the day of surgery.    Preventing a Surgical Site Infection:  For 2 to 3 days before surgery, avoid shaving with a razor because the razor can irritate skin and make it easier to develop an infection.    Any areas of open skin can increase the risk of a post-operative wound infection by allowing bacteria to enter and travel throughout the body.  Notify your surgeon if you have any skin wounds / rashes even if it is not near the expected surgical site.  The area will need assessed to determine if surgery should be delayed until it is healed.  The night prior to surgery shower using a fresh bar of anti-bacterial soap (such as Dial) and clean washcloth.  Sleep in a clean bed with clean clothing.  Do not allow pets to sleep with you.  Shower on the morning of surgery using a fresh bar of anti-bacterial soap (such as Dial) and clean washcloth.  Dry with a clean towel and dress in clean clothing.  Ask your surgeon if you will be receiving antibiotics prior to surgery.  Make sure you, your family, and all healthcare providers clean their hands with soap and water or an alcohol based hand  before caring for you or your wound.    Day of surgery:  Your arrival time is approximately two hours before your scheduled surgery time.  Upon arrival, a Pre-op nurse and Anesthesiologist will review your health history, obtain vital signs, and answer questions you may have.  The only belongings needed at this time will be a list of your home medications and if applicable your C-PAP/BI-PAP machine.  A Pre-op nurse will start an IV and you may receive medication in preparation for surgery, including something to help you relax.     Please be aware that surgery does come with discomfort.  We want to make every  effort to control your discomfort so please discuss any uncontrolled symptoms with your nurse.   Your doctor will most likely have prescribed pain medications.      If you are going home after surgery you will receive individualized written care instructions before being discharged.  A responsible adult must drive you to and from the hospital on the day of your surgery and stay with you for 24 hours.  Discharge prescriptions can be filled by the hospital pharmacy during regular pharmacy hours.  If you are having surgery late in the day/evening your prescription may be e-prescribed to your pharmacy.  Please verify your pharmacy hours or chose a 24 hour pharmacy to avoid not having access to your prescription because your pharmacy has closed for the day.    If you are staying overnight following surgery, you will be transported to your hospital room following the recovery period.  [facility] has all private rooms.    If you have any questions please call Pre-Admission Testing at [phone].  Deductibles and co-payments are collected on the day of service. Please be prepared to pay the required co-pay, deductible or deposit on the day of service as defined by your plan.    Call your surgeon immediately if you experience any of the following symptoms:  Sore Throat  Shortness of Breath or difficulty breathing  Cough  Chills  Body soreness or muscle pain  Headache  Fever  New loss of taste or smell  Do not arrive for your surgery ill.  Your procedure will need to be rescheduled to another time.  You will need to call your physician before the day of surgery to avoid any unnecessary exposure to hospital staff as well as other patients.

## 2023-04-13 RX ORDER — ASPIRIN 81 MG/1
81 TABLET ORAL 2 TIMES DAILY
Qty: 60 TABLET | Refills: 0 | Status: SHIPPED | OUTPATIENT
Start: 2023-04-13 | End: 2023-05-24

## 2023-04-13 RX ORDER — TRAMADOL HYDROCHLORIDE 50 MG/1
50 TABLET ORAL EVERY 6 HOURS PRN
Qty: 40 TABLET | Refills: 0 | Status: SHIPPED | OUTPATIENT
Start: 2023-04-13

## 2023-04-13 RX ORDER — ONDANSETRON 8 MG/1
8 TABLET, ORALLY DISINTEGRATING ORAL EVERY 8 HOURS PRN
Qty: 30 TABLET | Refills: 0 | Status: SHIPPED | OUTPATIENT
Start: 2023-04-13

## 2023-04-13 RX ORDER — NALOXONE HYDROCHLORIDE 4 MG/.1ML
SPRAY NASAL
Qty: 2 EACH | Refills: 0 | Status: SHIPPED | OUTPATIENT
Start: 2023-04-13

## 2023-04-13 RX ORDER — ACETAMINOPHEN 500 MG
500 TABLET ORAL EVERY 8 HOURS
Qty: 90 TABLET | Refills: 1 | Status: SHIPPED | OUTPATIENT
Start: 2023-04-13 | End: 2023-06-12

## 2023-04-13 RX ORDER — HYDROCODONE BITARTRATE AND ACETAMINOPHEN 10; 325 MG/1; MG/1
1 TABLET ORAL EVERY 4 HOURS PRN
Qty: 40 TABLET | Refills: 0 | Status: SHIPPED | OUTPATIENT
Start: 2023-04-13

## 2023-04-14 ENCOUNTER — ANESTHESIA (OUTPATIENT)
Dept: PERIOP | Facility: HOSPITAL | Age: 53
End: 2023-04-14
Payer: COMMERCIAL

## 2023-04-14 ENCOUNTER — HOSPITAL ENCOUNTER (OUTPATIENT)
Facility: HOSPITAL | Age: 53
Setting detail: HOSPITAL OUTPATIENT SURGERY
Discharge: HOME | End: 2023-04-14
Attending: STUDENT IN AN ORGANIZED HEALTH CARE EDUCATION/TRAINING PROGRAM | Admitting: STUDENT IN AN ORGANIZED HEALTH CARE EDUCATION/TRAINING PROGRAM
Payer: COMMERCIAL

## 2023-04-14 ENCOUNTER — APPOINTMENT (OUTPATIENT)
Dept: GENERAL RADIOLOGY | Facility: HOSPITAL | Age: 53
End: 2023-04-14
Payer: COMMERCIAL

## 2023-04-14 ENCOUNTER — ANESTHESIA EVENT (OUTPATIENT)
Dept: PERIOP | Facility: HOSPITAL | Age: 53
End: 2023-04-14
Payer: COMMERCIAL

## 2023-04-14 VITALS
RESPIRATION RATE: 16 BRPM | DIASTOLIC BLOOD PRESSURE: 74 MMHG | SYSTOLIC BLOOD PRESSURE: 109 MMHG | HEART RATE: 94 BPM | TEMPERATURE: 97.9 F | OXYGEN SATURATION: 94 %

## 2023-04-14 DIAGNOSIS — Z47.1 AFTERCARE FOLLOWING LEFT SHOULDER JOINT REPLACEMENT SURGERY: Primary | ICD-10-CM

## 2023-04-14 DIAGNOSIS — Z96.612 AFTERCARE FOLLOWING LEFT SHOULDER JOINT REPLACEMENT SURGERY: Primary | ICD-10-CM

## 2023-04-14 PROCEDURE — 25010000002 DEXAMETHASONE PER 1 MG: Performed by: ANESTHESIOLOGY

## 2023-04-14 PROCEDURE — 97110 THERAPEUTIC EXERCISES: CPT

## 2023-04-14 PROCEDURE — 25010000002 HYDROMORPHONE 1 MG/ML SOLUTION: Performed by: NURSE ANESTHETIST, CERTIFIED REGISTERED

## 2023-04-14 PROCEDURE — 25010000002 CEFAZOLIN IN DEXTROSE 2-4 GM/100ML-% SOLUTION: Performed by: STUDENT IN AN ORGANIZED HEALTH CARE EDUCATION/TRAINING PROGRAM

## 2023-04-14 PROCEDURE — 25010000002 ONDANSETRON PER 1 MG: Performed by: NURSE ANESTHETIST, CERTIFIED REGISTERED

## 2023-04-14 PROCEDURE — 97165 OT EVAL LOW COMPLEX 30 MIN: CPT

## 2023-04-14 PROCEDURE — 25010000002 VANCOMYCIN 10 G RECONSTITUTED SOLUTION: Performed by: STUDENT IN AN ORGANIZED HEALTH CARE EDUCATION/TRAINING PROGRAM

## 2023-04-14 PROCEDURE — 25010000002 MIDAZOLAM PER 1 MG: Performed by: ANESTHESIOLOGY

## 2023-04-14 PROCEDURE — 25010000002 ROPIVACAINE PER 1 MG: Performed by: ANESTHESIOLOGY

## 2023-04-14 PROCEDURE — 73030 X-RAY EXAM OF SHOULDER: CPT

## 2023-04-14 PROCEDURE — 25010000002 DEXAMETHASONE SODIUM PHOSPHATE 20 MG/5ML SOLUTION: Performed by: NURSE ANESTHETIST, CERTIFIED REGISTERED

## 2023-04-14 PROCEDURE — C1776 JOINT DEVICE (IMPLANTABLE): HCPCS | Performed by: STUDENT IN AN ORGANIZED HEALTH CARE EDUCATION/TRAINING PROGRAM

## 2023-04-14 PROCEDURE — 25010000002 VANCOMYCIN 1 G RECONSTITUTED SOLUTION: Performed by: STUDENT IN AN ORGANIZED HEALTH CARE EDUCATION/TRAINING PROGRAM

## 2023-04-14 PROCEDURE — 25010000002 PROPOFOL 10 MG/ML EMULSION: Performed by: NURSE ANESTHETIST, CERTIFIED REGISTERED

## 2023-04-14 PROCEDURE — 97535 SELF CARE MNGMENT TRAINING: CPT

## 2023-04-14 PROCEDURE — 25010000002 FENTANYL CITRATE (PF) 50 MCG/ML SOLUTION: Performed by: ANESTHESIOLOGY

## 2023-04-14 DEVICE — IMPLANTABLE DEVICE
Type: IMPLANTABLE DEVICE | Site: SHOULDER | Status: FUNCTIONAL
Brand: TORNIER PERFORM® REVERSED GLENOID

## 2023-04-14 DEVICE — SCRW AEQUALIS PERFORM PERIPH 5X26MM: Type: IMPLANTABLE DEVICE | Site: SHOULDER | Status: FUNCTIONAL

## 2023-04-14 DEVICE — IMPLANTABLE DEVICE: Type: IMPLANTABLE DEVICE | Status: FUNCTIONAL

## 2023-04-14 DEVICE — IMPLANTABLE DEVICE
Type: IMPLANTABLE DEVICE | Site: SHOULDER | Status: FUNCTIONAL
Brand: TORNIER PERFORM® REVERSED AUGMENTED GLENOID

## 2023-04-14 DEVICE — SCRW AEQUALIS PERFORM PERIPH 5X30MM: Type: IMPLANTABLE DEVICE | Site: SHOULDER | Status: FUNCTIONAL

## 2023-04-14 DEVICE — IMPLANTABLE DEVICE
Type: IMPLANTABLE DEVICE | Site: SHOULDER | Status: FUNCTIONAL
Brand: TORNIER PERFORM™ HUMERAL SYSTEM

## 2023-04-14 DEVICE — SUT NONABS MAXBRAID NMBR5 K60 38IN WHT/BLU: Type: IMPLANTABLE DEVICE | Site: SHOULDER | Status: FUNCTIONAL

## 2023-04-14 DEVICE — SUT NONABS MAXBRAID/PE NMBR2 C7 38IN BLU 900335: Type: IMPLANTABLE DEVICE | Site: SHOULDER | Status: FUNCTIONAL

## 2023-04-14 DEVICE — KNOTLESS TISSUE CONTROL DEVICE, UNDYED UNIDIRECTIONAL (ANTIBACTERIAL) SYNTHETIC ABSORBABLE DEVICE
Type: IMPLANTABLE DEVICE | Site: SHOULDER | Status: FUNCTIONAL
Brand: STRATAFIX

## 2023-04-14 RX ORDER — BUPIVACAINE HYDROCHLORIDE AND EPINEPHRINE 2.5; 5 MG/ML; UG/ML
INJECTION, SOLUTION INFILTRATION; PERINEURAL AS NEEDED
Status: DISCONTINUED | OUTPATIENT
Start: 2023-04-14 | End: 2023-04-14 | Stop reason: HOSPADM

## 2023-04-14 RX ORDER — SODIUM CHLORIDE 0.9 % (FLUSH) 0.9 %
10 SYRINGE (ML) INJECTION EVERY 12 HOURS SCHEDULED
Status: DISCONTINUED | OUTPATIENT
Start: 2023-04-14 | End: 2023-04-14 | Stop reason: HOSPADM

## 2023-04-14 RX ORDER — TRANEXAMIC ACID 100 MG/ML
INJECTION, SOLUTION INTRAVENOUS AS NEEDED
Status: DISCONTINUED | OUTPATIENT
Start: 2023-04-14 | End: 2023-04-14 | Stop reason: SURG

## 2023-04-14 RX ORDER — MAGNESIUM HYDROXIDE 1200 MG/15ML
LIQUID ORAL AS NEEDED
Status: DISCONTINUED | OUTPATIENT
Start: 2023-04-14 | End: 2023-04-14 | Stop reason: HOSPADM

## 2023-04-14 RX ORDER — DROPERIDOL 2.5 MG/ML
0.62 INJECTION, SOLUTION INTRAMUSCULAR; INTRAVENOUS
Status: DISCONTINUED | OUTPATIENT
Start: 2023-04-14 | End: 2023-04-14 | Stop reason: HOSPADM

## 2023-04-14 RX ORDER — LABETALOL HYDROCHLORIDE 5 MG/ML
5 INJECTION, SOLUTION INTRAVENOUS
Status: DISCONTINUED | OUTPATIENT
Start: 2023-04-14 | End: 2023-04-14 | Stop reason: HOSPADM

## 2023-04-14 RX ORDER — ASPIRIN 81 MG/1
81 TABLET ORAL EVERY 12 HOURS SCHEDULED
Status: DISCONTINUED | OUTPATIENT
Start: 2023-04-15 | End: 2023-04-14 | Stop reason: HOSPADM

## 2023-04-14 RX ORDER — SODIUM CHLORIDE, SODIUM LACTATE, POTASSIUM CHLORIDE, CALCIUM CHLORIDE 600; 310; 30; 20 MG/100ML; MG/100ML; MG/100ML; MG/100ML
9 INJECTION, SOLUTION INTRAVENOUS CONTINUOUS
Status: DISCONTINUED | OUTPATIENT
Start: 2023-04-14 | End: 2023-04-14 | Stop reason: HOSPADM

## 2023-04-14 RX ORDER — SODIUM CHLORIDE 0.9 % (FLUSH) 0.9 %
3-10 SYRINGE (ML) INJECTION AS NEEDED
Status: DISCONTINUED | OUTPATIENT
Start: 2023-04-14 | End: 2023-04-14 | Stop reason: HOSPADM

## 2023-04-14 RX ORDER — ONDANSETRON 2 MG/ML
4 INJECTION INTRAMUSCULAR; INTRAVENOUS EVERY 6 HOURS PRN
Status: DISCONTINUED | OUTPATIENT
Start: 2023-04-14 | End: 2023-04-14 | Stop reason: HOSPADM

## 2023-04-14 RX ORDER — HYDROCODONE BITARTRATE AND ACETAMINOPHEN 7.5; 325 MG/1; MG/1
1 TABLET ORAL EVERY 6 HOURS PRN
Status: DISCONTINUED | OUTPATIENT
Start: 2023-04-14 | End: 2023-04-14 | Stop reason: HOSPADM

## 2023-04-14 RX ORDER — OXYCODONE AND ACETAMINOPHEN 7.5; 325 MG/1; MG/1
1 TABLET ORAL EVERY 4 HOURS PRN
Status: DISCONTINUED | OUTPATIENT
Start: 2023-04-14 | End: 2023-04-14 | Stop reason: HOSPADM

## 2023-04-14 RX ORDER — PREGABALIN 75 MG/1
150 CAPSULE ORAL ONCE
Status: COMPLETED | OUTPATIENT
Start: 2023-04-14 | End: 2023-04-14

## 2023-04-14 RX ORDER — PROMETHAZINE HYDROCHLORIDE 25 MG/1
25 TABLET ORAL ONCE AS NEEDED
Status: DISCONTINUED | OUTPATIENT
Start: 2023-04-14 | End: 2023-04-14 | Stop reason: HOSPADM

## 2023-04-14 RX ORDER — HYDROCODONE BITARTRATE AND ACETAMINOPHEN 7.5; 325 MG/1; MG/1
1 TABLET ORAL ONCE
Status: COMPLETED | OUTPATIENT
Start: 2023-04-14 | End: 2023-04-14

## 2023-04-14 RX ORDER — ESMOLOL HYDROCHLORIDE 10 MG/ML
INJECTION INTRAVENOUS AS NEEDED
Status: DISCONTINUED | OUTPATIENT
Start: 2023-04-14 | End: 2023-04-14 | Stop reason: SURG

## 2023-04-14 RX ORDER — DEXAMETHASONE SODIUM PHOSPHATE 4 MG/ML
INJECTION, SOLUTION INTRA-ARTICULAR; INTRALESIONAL; INTRAMUSCULAR; INTRAVENOUS; SOFT TISSUE AS NEEDED
Status: DISCONTINUED | OUTPATIENT
Start: 2023-04-14 | End: 2023-04-14 | Stop reason: SURG

## 2023-04-14 RX ORDER — ACETAMINOPHEN 500 MG
1000 TABLET ORAL ONCE
Status: COMPLETED | OUTPATIENT
Start: 2023-04-14 | End: 2023-04-14

## 2023-04-14 RX ORDER — FAMOTIDINE 20 MG/1
40 TABLET, FILM COATED ORAL DAILY
Status: DISCONTINUED | OUTPATIENT
Start: 2023-04-14 | End: 2023-04-14 | Stop reason: HOSPADM

## 2023-04-14 RX ORDER — PROPOFOL 10 MG/ML
VIAL (ML) INTRAVENOUS AS NEEDED
Status: DISCONTINUED | OUTPATIENT
Start: 2023-04-14 | End: 2023-04-14 | Stop reason: SURG

## 2023-04-14 RX ORDER — PROMETHAZINE HYDROCHLORIDE 25 MG/1
25 SUPPOSITORY RECTAL ONCE AS NEEDED
Status: DISCONTINUED | OUTPATIENT
Start: 2023-04-14 | End: 2023-04-14 | Stop reason: HOSPADM

## 2023-04-14 RX ORDER — SODIUM CHLORIDE 0.9 % (FLUSH) 0.9 %
10 SYRINGE (ML) INJECTION AS NEEDED
Status: DISCONTINUED | OUTPATIENT
Start: 2023-04-14 | End: 2023-04-14 | Stop reason: HOSPADM

## 2023-04-14 RX ORDER — VANCOMYCIN HYDROCHLORIDE 1 G/20ML
INJECTION, POWDER, LYOPHILIZED, FOR SOLUTION INTRAVENOUS AS NEEDED
Status: DISCONTINUED | OUTPATIENT
Start: 2023-04-14 | End: 2023-04-14 | Stop reason: HOSPADM

## 2023-04-14 RX ORDER — ONDANSETRON 2 MG/ML
INJECTION INTRAMUSCULAR; INTRAVENOUS AS NEEDED
Status: DISCONTINUED | OUTPATIENT
Start: 2023-04-14 | End: 2023-04-14 | Stop reason: SURG

## 2023-04-14 RX ORDER — AMOXICILLIN 250 MG
2 CAPSULE ORAL 2 TIMES DAILY PRN
Status: DISCONTINUED | OUTPATIENT
Start: 2023-04-14 | End: 2023-04-14 | Stop reason: HOSPADM

## 2023-04-14 RX ORDER — CEFAZOLIN SODIUM 2 G/100ML
2 INJECTION, SOLUTION INTRAVENOUS ONCE
Status: COMPLETED | OUTPATIENT
Start: 2023-04-14 | End: 2023-04-14

## 2023-04-14 RX ORDER — HYDRALAZINE HYDROCHLORIDE 20 MG/ML
5 INJECTION INTRAMUSCULAR; INTRAVENOUS
Status: DISCONTINUED | OUTPATIENT
Start: 2023-04-14 | End: 2023-04-14 | Stop reason: HOSPADM

## 2023-04-14 RX ORDER — FLUMAZENIL 0.1 MG/ML
0.2 INJECTION INTRAVENOUS AS NEEDED
Status: DISCONTINUED | OUTPATIENT
Start: 2023-04-14 | End: 2023-04-14 | Stop reason: HOSPADM

## 2023-04-14 RX ORDER — MIDAZOLAM HYDROCHLORIDE 1 MG/ML
1 INJECTION INTRAMUSCULAR; INTRAVENOUS
Status: DISCONTINUED | OUTPATIENT
Start: 2023-04-14 | End: 2023-04-14 | Stop reason: HOSPADM

## 2023-04-14 RX ORDER — HYDROCODONE BITARTRATE AND ACETAMINOPHEN 7.5; 325 MG/1; MG/1
1 TABLET ORAL ONCE AS NEEDED
Status: COMPLETED | OUTPATIENT
Start: 2023-04-14 | End: 2023-04-14

## 2023-04-14 RX ORDER — DIPHENHYDRAMINE HYDROCHLORIDE 50 MG/ML
12.5 INJECTION INTRAMUSCULAR; INTRAVENOUS
Status: DISCONTINUED | OUTPATIENT
Start: 2023-04-14 | End: 2023-04-14 | Stop reason: HOSPADM

## 2023-04-14 RX ORDER — ONDANSETRON 4 MG/1
4 TABLET, FILM COATED ORAL EVERY 6 HOURS PRN
Status: DISCONTINUED | OUTPATIENT
Start: 2023-04-14 | End: 2023-04-14 | Stop reason: HOSPADM

## 2023-04-14 RX ORDER — DIPHENHYDRAMINE HCL 25 MG
25 CAPSULE ORAL EVERY 6 HOURS PRN
Status: DISCONTINUED | OUTPATIENT
Start: 2023-04-14 | End: 2023-04-14 | Stop reason: HOSPADM

## 2023-04-14 RX ORDER — DEXAMETHASONE SODIUM PHOSPHATE 4 MG/ML
INJECTION, SOLUTION INTRA-ARTICULAR; INTRALESIONAL; INTRAMUSCULAR; INTRAVENOUS; SOFT TISSUE
Status: COMPLETED | OUTPATIENT
Start: 2023-04-14 | End: 2023-04-14

## 2023-04-14 RX ORDER — HYDROMORPHONE HYDROCHLORIDE 1 MG/ML
0.5 INJECTION, SOLUTION INTRAMUSCULAR; INTRAVENOUS; SUBCUTANEOUS
Status: DISCONTINUED | OUTPATIENT
Start: 2023-04-14 | End: 2023-04-14 | Stop reason: HOSPADM

## 2023-04-14 RX ORDER — FAMOTIDINE 10 MG/ML
20 INJECTION, SOLUTION INTRAVENOUS ONCE
Status: COMPLETED | OUTPATIENT
Start: 2023-04-14 | End: 2023-04-14

## 2023-04-14 RX ORDER — OXYCODONE HYDROCHLORIDE AND ACETAMINOPHEN 5; 325 MG/1; MG/1
2 TABLET ORAL EVERY 4 HOURS PRN
Status: DISCONTINUED | OUTPATIENT
Start: 2023-04-14 | End: 2023-04-14 | Stop reason: HOSPADM

## 2023-04-14 RX ORDER — SODIUM CHLORIDE 9 MG/ML
100 INJECTION, SOLUTION INTRAVENOUS CONTINUOUS
Status: DISCONTINUED | OUTPATIENT
Start: 2023-04-14 | End: 2023-04-14 | Stop reason: HOSPADM

## 2023-04-14 RX ORDER — LIDOCAINE HYDROCHLORIDE 20 MG/ML
INJECTION, SOLUTION EPIDURAL; INFILTRATION; INTRACAUDAL; PERINEURAL AS NEEDED
Status: DISCONTINUED | OUTPATIENT
Start: 2023-04-14 | End: 2023-04-14 | Stop reason: SURG

## 2023-04-14 RX ORDER — DOCUSATE SODIUM 100 MG/1
100 CAPSULE, LIQUID FILLED ORAL 2 TIMES DAILY PRN
Status: DISCONTINUED | OUTPATIENT
Start: 2023-04-14 | End: 2023-04-14 | Stop reason: HOSPADM

## 2023-04-14 RX ORDER — CEFAZOLIN SODIUM 2 G/100ML
2 INJECTION, SOLUTION INTRAVENOUS EVERY 8 HOURS
Status: DISCONTINUED | OUTPATIENT
Start: 2023-04-14 | End: 2023-04-14 | Stop reason: HOSPADM

## 2023-04-14 RX ORDER — LIDOCAINE HYDROCHLORIDE 10 MG/ML
0.5 INJECTION, SOLUTION EPIDURAL; INFILTRATION; INTRACAUDAL; PERINEURAL ONCE AS NEEDED
Status: DISCONTINUED | OUTPATIENT
Start: 2023-04-14 | End: 2023-04-14 | Stop reason: HOSPADM

## 2023-04-14 RX ORDER — CEFAZOLIN SODIUM IN 0.9 % NACL 2 G/100 ML
2 PLASTIC BAG, INJECTION (ML) INTRAVENOUS EVERY 8 HOURS SCHEDULED
Status: DISCONTINUED | OUTPATIENT
Start: 2023-04-14 | End: 2023-04-14

## 2023-04-14 RX ORDER — SODIUM CHLORIDE 0.9 % (FLUSH) 0.9 %
3 SYRINGE (ML) INJECTION EVERY 12 HOURS SCHEDULED
Status: DISCONTINUED | OUTPATIENT
Start: 2023-04-14 | End: 2023-04-14 | Stop reason: HOSPADM

## 2023-04-14 RX ORDER — ROCURONIUM BROMIDE 10 MG/ML
INJECTION, SOLUTION INTRAVENOUS AS NEEDED
Status: DISCONTINUED | OUTPATIENT
Start: 2023-04-14 | End: 2023-04-14 | Stop reason: SURG

## 2023-04-14 RX ORDER — ONDANSETRON 2 MG/ML
4 INJECTION INTRAMUSCULAR; INTRAVENOUS ONCE AS NEEDED
Status: DISCONTINUED | OUTPATIENT
Start: 2023-04-14 | End: 2023-04-14 | Stop reason: HOSPADM

## 2023-04-14 RX ORDER — NALOXONE HCL 0.4 MG/ML
0.2 VIAL (ML) INJECTION AS NEEDED
Status: DISCONTINUED | OUTPATIENT
Start: 2023-04-14 | End: 2023-04-14 | Stop reason: HOSPADM

## 2023-04-14 RX ORDER — SODIUM CHLORIDE 9 MG/ML
40 INJECTION, SOLUTION INTRAVENOUS AS NEEDED
Status: DISCONTINUED | OUTPATIENT
Start: 2023-04-14 | End: 2023-04-14 | Stop reason: HOSPADM

## 2023-04-14 RX ORDER — FENTANYL CITRATE 50 UG/ML
50 INJECTION, SOLUTION INTRAMUSCULAR; INTRAVENOUS
Status: DISCONTINUED | OUTPATIENT
Start: 2023-04-14 | End: 2023-04-14 | Stop reason: HOSPADM

## 2023-04-14 RX ORDER — PROMETHAZINE HYDROCHLORIDE 12.5 MG/1
12.5 SUPPOSITORY RECTAL EVERY 6 HOURS PRN
Status: DISCONTINUED | OUTPATIENT
Start: 2023-04-14 | End: 2023-04-14 | Stop reason: HOSPADM

## 2023-04-14 RX ORDER — ROPIVACAINE HYDROCHLORIDE 5 MG/ML
INJECTION, SOLUTION EPIDURAL; INFILTRATION; PERINEURAL
Status: COMPLETED | OUTPATIENT
Start: 2023-04-14 | End: 2023-04-14

## 2023-04-14 RX ORDER — EPHEDRINE SULFATE 50 MG/ML
5 INJECTION, SOLUTION INTRAVENOUS ONCE AS NEEDED
Status: DISCONTINUED | OUTPATIENT
Start: 2023-04-14 | End: 2023-04-14 | Stop reason: HOSPADM

## 2023-04-14 RX ORDER — CELECOXIB 200 MG/1
200 CAPSULE ORAL ONCE
Status: COMPLETED | OUTPATIENT
Start: 2023-04-14 | End: 2023-04-14

## 2023-04-14 RX ORDER — OXYCODONE HYDROCHLORIDE AND ACETAMINOPHEN 5; 325 MG/1; MG/1
1 TABLET ORAL EVERY 4 HOURS PRN
Status: DISCONTINUED | OUTPATIENT
Start: 2023-04-14 | End: 2023-04-14 | Stop reason: HOSPADM

## 2023-04-14 RX ORDER — PROMETHAZINE HYDROCHLORIDE 25 MG/1
12.5 TABLET ORAL EVERY 6 HOURS PRN
Status: DISCONTINUED | OUTPATIENT
Start: 2023-04-14 | End: 2023-04-14 | Stop reason: HOSPADM

## 2023-04-14 RX ORDER — NALOXONE HCL 0.4 MG/ML
0.1 VIAL (ML) INJECTION
Status: DISCONTINUED | OUTPATIENT
Start: 2023-04-14 | End: 2023-04-14 | Stop reason: HOSPADM

## 2023-04-14 RX ORDER — IPRATROPIUM BROMIDE AND ALBUTEROL SULFATE 2.5; .5 MG/3ML; MG/3ML
3 SOLUTION RESPIRATORY (INHALATION) ONCE AS NEEDED
Status: DISCONTINUED | OUTPATIENT
Start: 2023-04-14 | End: 2023-04-14 | Stop reason: HOSPADM

## 2023-04-14 RX ORDER — DIPHENHYDRAMINE HYDROCHLORIDE 50 MG/ML
25 INJECTION INTRAMUSCULAR; INTRAVENOUS EVERY 6 HOURS PRN
Status: DISCONTINUED | OUTPATIENT
Start: 2023-04-14 | End: 2023-04-14 | Stop reason: HOSPADM

## 2023-04-14 RX ADMIN — CEFAZOLIN SODIUM 2 G: 2 INJECTION, SOLUTION INTRAVENOUS at 08:38

## 2023-04-14 RX ADMIN — HYDROCODONE BITARTRATE AND ACETAMINOPHEN 1 TABLET: 7.5; 325 TABLET ORAL at 13:42

## 2023-04-14 RX ADMIN — PROPOFOL 200 MG: 10 INJECTION, EMULSION INTRAVENOUS at 08:48

## 2023-04-14 RX ADMIN — VANCOMYCIN HYDROCHLORIDE 1750 MG: 10 INJECTION, POWDER, LYOPHILIZED, FOR SOLUTION INTRAVENOUS at 07:52

## 2023-04-14 RX ADMIN — ROCURONIUM BROMIDE 15 MG: 50 INJECTION INTRAVENOUS at 09:52

## 2023-04-14 RX ADMIN — DEXAMETHASONE SODIUM PHOSPHATE 4 MG: 4 INJECTION, SOLUTION INTRAMUSCULAR; INTRAVENOUS at 08:15

## 2023-04-14 RX ADMIN — TRANEXAMIC ACID 1000 MG: 100 INJECTION INTRAVENOUS at 10:46

## 2023-04-14 RX ADMIN — ROPIVACAINE HYDROCHLORIDE 20 ML: 5 INJECTION EPIDURAL; INFILTRATION; PERINEURAL at 08:15

## 2023-04-14 RX ADMIN — HYDROCODONE BITARTRATE AND ACETAMINOPHEN 1 TABLET: 7.5; 325 TABLET ORAL at 12:03

## 2023-04-14 RX ADMIN — TRANEXAMIC ACID 1000 MG: 100 INJECTION INTRAVENOUS at 09:09

## 2023-04-14 RX ADMIN — PREGABALIN 150 MG: 75 CAPSULE ORAL at 07:54

## 2023-04-14 RX ADMIN — HYDROMORPHONE HYDROCHLORIDE 0.5 MG: 1 INJECTION, SOLUTION INTRAMUSCULAR; INTRAVENOUS; SUBCUTANEOUS at 11:03

## 2023-04-14 RX ADMIN — FAMOTIDINE 20 MG: 10 INJECTION INTRAVENOUS at 08:22

## 2023-04-14 RX ADMIN — DEXAMETHASONE SODIUM PHOSPHATE 8 MG: 4 INJECTION, SOLUTION INTRAMUSCULAR; INTRAVENOUS at 09:05

## 2023-04-14 RX ADMIN — LIDOCAINE HYDROCHLORIDE 100 MG: 20 INJECTION, SOLUTION EPIDURAL; INFILTRATION; INTRACAUDAL; PERINEURAL at 08:48

## 2023-04-14 RX ADMIN — ACETAMINOPHEN 1000 MG: 500 TABLET, FILM COATED ORAL at 07:54

## 2023-04-14 RX ADMIN — ONDANSETRON 4 MG: 2 INJECTION INTRAMUSCULAR; INTRAVENOUS at 10:49

## 2023-04-14 RX ADMIN — MIDAZOLAM 1 MG: 1 INJECTION INTRAMUSCULAR; INTRAVENOUS at 08:22

## 2023-04-14 RX ADMIN — ROCURONIUM BROMIDE 50 MG: 50 INJECTION INTRAVENOUS at 08:48

## 2023-04-14 RX ADMIN — SODIUM CHLORIDE, POTASSIUM CHLORIDE, SODIUM LACTATE AND CALCIUM CHLORIDE: 600; 310; 30; 20 INJECTION, SOLUTION INTRAVENOUS at 08:45

## 2023-04-14 RX ADMIN — HYDROMORPHONE HYDROCHLORIDE 0.5 MG: 1 INJECTION, SOLUTION INTRAMUSCULAR; INTRAVENOUS; SUBCUTANEOUS at 10:58

## 2023-04-14 RX ADMIN — CELECOXIB 200 MG: 200 CAPSULE ORAL at 07:54

## 2023-04-14 RX ADMIN — FENTANYL CITRATE 50 MCG: 50 INJECTION, SOLUTION INTRAMUSCULAR; INTRAVENOUS at 08:22

## 2023-04-14 RX ADMIN — ESMOLOL HYDROCHLORIDE 30 MG: 100 INJECTION, SOLUTION INTRAVENOUS at 08:49

## 2023-04-14 NOTE — INTERVAL H&P NOTE
H&P reviewed.  The patient was examined and there are no changes to the H&P. Left shoulder reverse shoulder replacement and open biceps tenodesis.

## 2023-04-14 NOTE — ANESTHESIA PROCEDURE NOTES
Peripheral Block      Patient reassessed immediately prior to procedure    Patient location during procedure: pre-op  Start time: 4/14/2023 8:15 AM  Stop time: 4/14/2023 8:20 AM  Reason for block: procedure for pain, at surgeon's request and post-op pain management  Performed by  Anesthesiologist: Torres Ruano MD  Preanesthetic Checklist  Completed: patient identified, IV checked, site marked, risks and benefits discussed, surgical consent, monitors and equipment checked, pre-op evaluation and timeout performed  Prep:  Pt Position: supine  Sterile barriers:cap, gloves, sterile barriers, washed/disinfected hands and mask  Prep: ChloraPrep  Patient monitoring: blood pressure monitoring, continuous pulse oximetry and EKG  Procedure    Sedation: yes  Performed under: local infiltration  Guidance:ultrasound guided  Images:still images obtained, printed/placed on chart    Laterality:left  Block Type:interscalene  Injection Technique:single-shot  Needle Type:echogenic  Resistance on Injection: none    Medications Used: ropivacaine (NAROPIN) 0.5 % injection - Injection   20 mL - 4/14/2023 8:15:00 AM  dexamethasone (DECADRON) injection - Injection   4 mg - 4/14/2023 8:15:00 AM      Post Assessment  Injection Assessment: negative aspiration for heme, no paresthesia on injection and incremental injection  Patient Tolerance:comfortable throughout block  Complications:no  Additional Notes  USG used to verify needle placement and medication administration

## 2023-04-14 NOTE — OP NOTE
Orthopaedic & Sports Medicine Surgery  Dr. Amish Scales MD  [phone]    Operative Note    PATIENT NAME: Mary Early  MRN: [id number]  : 1970 AGE: 52 y.o. GENDER: female  DATE OF OPERATION: 2023  PREOPERATIVE DIAGNOSIS: Left proximal humerus fracture  POSTOPERATIVE DIAGNOSIS: Left proximal humerus fracture, biceps tendinosis,   OPERATION PERFORMED: Left Reverse Total Shoulder, open proximal biceps tenodesis  SURGEON: Amish Scales MD  Circulator: Jordana Najera RN  Scrub Person: Yvette Yuan  Vendor Representative: Julian Malave  Assistant: Keri Le APRN  ANESTHESIA: General with Block  ASSISTANT: Keri Le an assistant was necessary and used to help with reduction, retracting, positioning, closing.   ESTIMATED BLOOD LOSS: 300cc  ANTIBIOTICS: Ancef and vancomycin  SPONGE AND NEEDLE COUNT: Correct  INDICATIONS: The patient was found to have a proximal humerus fracture.  Due to the characteristics of the fracture and their age and activity level the patient elected to undergo a reverse shoulder replacement for fracture fixation.  The risks of surgery were discussed and included the risk of anesthesia, infection, damage to neurovascular structures, implant loosening/failure, hardware prominence, nonunion/malunion, the need for further procedures, medical complications, stiffness, continued pain, dislocation, damage to nerve and blood vessels, DVT, PE, MI, stroke, death, and others. No guarantees were made. The patient wished to proceed with surgery and a surgical consent was signed.    COMPONENTS:   · Alena reverse shoulder system  · Performed glenoid baseplate -25 15 degree full wedge standard baseplate  · 28 perform reverse glenoid with short press-fit post  · Glenosphere -36 mm  · 20 perform humeral stem size 3  · Tray/poly: 20 perform humeral reversed insert size 36 mm    PERTINENT FINDINGS: End-stage left shoulder primary osteoarthritis with severe biceps tendinosis,  glenoid retroversion    DETAILS OF PROCEDURE:  The patient was met in the preoperative area. The site was marked. The consent and H&P were reviewed. The patient was then wheeled back to the operative suite and transferred to the operative table. The patient underwent anesthesia. Surgical alcohol was used to thoroughly clean the operative area. The arm was prepped and draped in the normal sterile fashion which included ChloraPrep and multiple layers of sterile draping.  A surgical timeout was performed in which administration of preoperative antibiotics as well as the surgical site were confirmed.    An deltopectoral approach to the proximal humerus was then utilized.  Care was taken to avoid damage to the cephalic vein.  Dissection was carried down to the proximal humerus where retractors were placed appropriately.      We identified the biceps in the bicipital groove.  There was significant tendinosis and partial tearing of the biceps we elected to do a biceps tenodesis.  We released the biceps from the groove and did a soft tissue tenodesis to the superior border of the pec.  We then cut the biceps and followed up through the groove and rotator interval into the joint, and excised it from its origin.    We performed our releases.  We tagged the subscapularis and then performed a subscapularis peel.  The glenosphere was then secured we then delivered the humerus into the wound.  We used the intramedullary guide with 30 degrees of retroversion to put our humeral head cut on just medial to the rotator cuff insertion.  We pinned this in place.  We then made our humeral head cut flush with the guide.  We put the humeral protector in place.    We then turned our attention to the glenoid and safely put our retractors around the glenoid for adequate exposure, making sure to avoid the axillary nerve.  We used the 15 degree full wedge guide and put our guidepin in.  It was in good position.  We then reamed and drilled for  our baseplate.  We malleted the baseplate down and had good circumferential contact with the center of the augment in the posterior superior position.    We then drilled and placed the 4 circumferential screws around and had adequate bite.  In position and had good fixation of the central screw.  We checked with a hemostat that the baseplate was fully down and the glenosphere was fully engaged.    We turned our attention to the humerus component.  Due to the we then used the guide and the humerus and chose a size 3.  We put the central pin in place.  We then use the bowl reamer to prepare.  We put the appropriate sized trial down with a trial poly and reduced the components.  It reduced with good tension.  Had excellent range of motion.  We decided on these components.    We took out the trial and irrigated out the humeral stem as well as the shoulder.  We instructed the component on the back table.  We then seated down the humeral stem component to the same depth as the trial with the appropriate version.  We make sure that there were no calcar fractures.  We then applied the tray and poly components.  We reduced the shoulder and again had good stability and range of motion with no impingement.    We irrigated the shoulder with Irrisept and then pulse lavage.  We then secured the subscapularis tendon down with three #5 FiberWire sutures we had previously put through drill holes in the bicipital groove.  We had good repair of the subscapularis.    We irrigated out the shoulder and placed 1 g of vancomycin powder in the wound.  We then closed the rotator interval with interrupted sutures.    The deltopectoral interval was then closed.  The subcu and skin was then closed in layers.  A sterile dressing and sling was then applied.  The patient was awoken from anesthesia and moved over to the bed.  The patient was then taken the PACU in good condition.    Post Operative Course:   ASA 81 mg twice daily for DVT prophylaxis  for 4 weeks  Pendulum exercises for now, nonweightbearing operative extremity, will start formal physical therapy for range of motion 2 weeks postop.    Amish Scales MD  [facility]  Orthopaedic Surgery, Sports Medicine  [phone]

## 2023-04-14 NOTE — ANESTHESIA POSTPROCEDURE EVALUATION
Patient: Mary Early    Procedure Summary     Date: 04/14/23 Room / Location:  [facility] [address] [place] [address] [facility]    Anesthesia Start: 0842 Anesthesia Stop: 1119    Procedure: LEFT SHOULDER REPLACEMENT OPEN BICEPS TENODESIS (Left: Shoulder) Diagnosis:     Surgeons: Amish Scales MD Provider: Chandrakant Mejia MD    Anesthesia Type: general ASA Status: 3          Anesthesia Type: general    Vitals  Vitals Value Taken Time   /84 04/14/23 1215   Temp 36.6 °C (97.9 °F) 04/14/23 1215   Pulse 95 04/14/23 1226   Resp 16 04/14/23 1215   SpO2 96 % 04/14/23 1226   Vitals shown include unvalidated device data.        Post Anesthesia Care and Evaluation    Patient location during evaluation: bedside  Patient participation: complete - patient participated  Level of consciousness: awake and alert  Pain management: adequate    Airway patency: patent  Anesthetic complications: No anesthetic complications  PONV Status: controlled  Cardiovascular status: blood pressure returned to baseline and acceptable  Respiratory status: acceptable  Hydration status: acceptable

## 2023-04-14 NOTE — DISCHARGE INSTRUCTIONS
What to expect after a Nerve Block    Nerve blocks administered to block pain affect many types of nerves, including those nerves that control movement, pain, and normal sensation. Following a nerve block, you may notice some bruising at the site where the block was given. You may experience sensations such as: numbness of the affected area or limb, tingling, heaviness (that is the limb feels heavy to you), weakness or inability to move the affected arm or leg, or a feeling as if your arm or leg has “fallen asleep.”     A nerve block can last from 2 to 36 hours depending on the medications used.  Usually the weakness wears off first followed by the tingling and heaviness. As the block wears off, you may begin to notice pain; however, this sequence of events may occur in any order. Typically, you will be able to move your limb before you will feel it. Once a nerve block begins to wear off, the effects are usually completely gone within 60 minutes.  If you experience continued side effects that you believe are block related for longer than 48 hours, please call your healthcare provider. Please see block-specific instructions below.    Instructions for any block involving the shoulder or arm  If you have had any kind of shoulder/arm block, you will go home with your arm in a sling. Wear the sling until the block has completely worn off. You may be required to wear it for a longer period of time per your surgeon’s recommendations.  If you have had a shoulder/arm block, it is a good idea to sleep on a recliner with pillows under your arm.    You may experience symptoms such as:  Shortness of breath  Hoarseness   Blurry vision  Unequal pupils  Drooping of your face on the same side as the block was performed    These are side effects associated with this kind of block and should go away within 12 hours.    Note: If you have severe or prolonged shortness of breath, please seek medical assistance as soon as possible.      Protection of a “blocked” arm or leg (limb)  After a nerve block, you cannot feel pain, pressure, or extremes of temperature in the affected limb. And because of this, your blocked limb is at more risk for injury. For example, it is possible to burn your limb on an extremely hot surface without feeling it.     When resting, it is important to reposition your limb periodically to avoid prolonged pressure on it. This may require the use of pillows and padding.    While sleeping, you should avoid rolling onto the affected limb or putting too much pressure on it.     If you have a cast or tight dressing, check the color of your fingers or toes of the affected limb. Call your surgeon if they look discolored (that is, dusky, dark colored).    Use caution in cold weather. Cover your limb appropriately to protect it from the cold.      Pain Management:    Your surgeon will give you a prescription for pain medication. Begin taking this before the nerve block wears off. Bear in mind that sometimes the block can wear off in the middle of the night.      FOLLOW INSTRUCTIONS GIVEN TO YOU BY DR. COLLINS.

## 2023-04-14 NOTE — H&P
[facility]   HISTORY AND PHYSICAL    Patient Name: Mary Early  : 1970  MRN: [id number]  Primary Care Physician:  Jaun Lee MD  Date of admission: (Not on file)    Subjective   Subjective     Chief Complaint: Left shoulder pain    History of Present Illness: left shoulder arthritis and biceps tendinitis.     Review of Systems     Personal History     Past Medical History:   Diagnosis Date   • Anemia    • Anxiety    • Arthritis    • Depression    • Headache, tension-type    • Hyperlipidemia    • Hypertension        Past Surgical History:   Procedure Laterality Date   • BREAST BIOPSY     •  SECTION      1986,1991,1994   • FOOT SURGERY Left    • GALLBLADDER SURGERY     • HYSTERECTOMY     • WRIST ARTHROPLASTY Bilateral        Family History: family history includes Alcohol abuse in her father and mother; Arthritis in her mother; Heart disease in her mother; Hyperlipidemia in her mother; Hypertension in her father and mother; Obesity in her mother; Stroke in her mother; Thyroid disease in her mother. Otherwise pertinent FHx was reviewed and not pertinent to current issue.    Social History:  reports that she has never smoked. She has never used smokeless tobacco. She reports current alcohol use. She reports that she does not use drugs.    Home Medications:  Estradiol-Norethindrone Acet, HYDROcodone-acetaminophen, amLODIPine-benazepril, atorvastatin, b complex-C-E-zinc, baclofen, cholecalciferol, triamterene-hydrochlorothiazide, and venlafaxine XR    Allergies:  No Known Allergies    Objective    Objective     Vitals:        Physical Exam    Result Review    Result Review:  I have personally reviewed the results from the time of this admission to 2023 22:48 EDT and agree with these findings:  []  Laboratory list / accordion  []  Microbiology  []  Radiology  []  EKG/Telemetry   []  Cardiology/Vascular   []  Pathology  []  Old records  []  Other:  Most notable  findings include: Left shoulder severe OA.       Assessment & Plan   Assessment / Plan     Brief Patient Summary:  Mary Early is a 52 y.o. female who has severe left shoulder OA and biceps tendinitis. Failed extensive conservative treatment.     Active Hospital Problems:  There are no active hospital problems to display for this patient.    Plan: Left reverse total shoulder replacement with biceps tenodesis.       DVT prophylaxis:  No DVT prophylaxis order currently exists.    CODE STATUS:       Admission Status:  I believe this patient meets outpatient status.    Amish Scales MD

## 2023-04-14 NOTE — THERAPY DISCHARGE NOTE
Acute Care - Occupational Therapy Discharge  [facility]    Patient Name: Mary Early  : 1970    MRN: [id number]                              Today's Date: 2023       Admit Date: 2023    Visit Dx:     ICD-10-CM ICD-9-CM   1. Aftercare following left shoulder joint replacement surgery  Z47.1 V54.81    Z96.612 V43.61     Patient Active Problem List   Diagnosis   • Pericardial effusion   • Abnormal CT of the chest   • Primary hypertension     Past Medical History:   Diagnosis Date   • Anemia    • Anxiety    • Arthritis    • Depression    • Headache, tension-type    • Hyperlipidemia    • Hypertension      Past Surgical History:   Procedure Laterality Date   • BREAST BIOPSY     •  SECTION      1986,1991,1994   • FOOT SURGERY Left    • GALLBLADDER SURGERY     • HYSTERECTOMY     • WRIST ARTHROPLASTY Bilateral       General Information     Row Name 23 1506          OT Time and Intention    Document Type discharge evaluation/summary  -RB     Mode of Treatment individual therapy;occupational therapy  -RB     Row Name 23 1506          General Information    Patient Profile Reviewed yes  -RB     Prior Level of Function independent:;ADL's;transfer  -RB     Existing Precautions/Restrictions fall;shoulder  -RB     Barriers to Rehab none identified  -RB     Row Name 23 1506          Living Environment    People in Home spouse  -RB     Row Name 23 1506          Cognition    Orientation Status (Cognition) oriented x 4  -RB     Row Name 23 1506          Safety Issues, Functional Mobility    Impairments Affecting Function (Mobility) sensation/sensory awareness;strength;pain;range of motion (ROM)  -RB           User Key  (r) = Recorded By, (t) = Taken By, (c) = Cosigned By    Initials Name Provider Type    RB Remedios Zarate OT Occupational Therapist               Mobility/ADL's     Row Name 23 1516          Bed Mobility    Bed Mobility  supine-sit  -RB     Supine-Sit [place] (Bed Mobility) standby assist  -RB     Bed Mobility, Safety Issues decreased use of arms for pushing/pulling  -RB     Assistive Device (Bed Mobility) bed rails  -RB     Row Name 04/14/23 1516          Transfers    Transfers sit-stand transfer;toilet transfer;stand-sit transfer;bed-chair transfer  -RB     Row Name 04/14/23 1516          Bed-Chair Transfer    Bed-Chair [place] (Transfers) standby assist  -RB     Row Name 04/14/23 1516          Stand-Sit Transfer    Stand-Sit [place] (Transfers) standby assist  -RB     Row Name 04/14/23 1516          Toilet Transfer    [place] Level (Toilet Transfer) standby assist  -RB     Row Name 04/14/23 1516          Functional Mobility    Functional Mobility- Ind. Level standby assist  -RB     Row Name 04/14/23 1516          Activities of Daily Living    BADL Assessment/Intervention lower body dressing;upper body dressing;toileting  -RB     Row Name 04/14/23 1516          Mobility    Extremity Weight-bearing Status left upper extremity  -RB     Left Upper Extremity (Weight-bearing Status) non weight-bearing (NWB)  -RB     Row Name 04/14/23 1516          Lower Body Dressing Assessment/Training    [place] Level (Lower Body Dressing) lower body dressing skills;minimum assist (75% patient effort)  -RB     Row Name 04/14/23 1516          Upper Body Dressing Assessment/Training    [place] Level (Upper Body Dressing) upper body dressing skills;minimum assist (75% patient effort);verbal cues  -RB     Row Name 04/14/23 1516          Toileting Assessment/Training    [place] Level (Toileting) toileting skills;standby assist  -RB     Position (Toileting) supported sitting;supported standing  -RB           User Key  (r) = Recorded By, (t) = Taken By, (c) = Cosigned By    Initials Name Provider Type    RB Remedios Zarate OT Occupational Therapist               Obj/Interventions     Row Name 04/14/23 1517           Sensory Assessment (Somatosensory)    Sensory Assessment nerve block remains intact  -RB     Row Name 04/14/23 1517          Vision Assessment/Intervention    Visual Impairment/Limitations WFL  -RB     Row Name 04/14/23 1517          Range of Motion Comprehensive    Comment, General Range of Motion LUE impaired post op  -RB     Row Name 04/14/23 1517          Motor Skills    Therapeutic Exercise --  Pt able to demo her HEP in her RUE for carryover to LUE on POD 1  -RB     Row Name 04/14/23 1517          Balance    Comment, Balance SBA  -RB           User Key  (r) = Recorded By, (t) = Taken By, (c) = Cosigned By    Initials Name Provider Type    Remedios Doimnique OT Occupational Therapist               Goals/Plan     Row Name 04/14/23 1518          ROM Goal 1 (OT)    ROM Goal 1 (OT) Pt and family to demo understanding of her HEP for d/c  -RB     Time Frame (ROM Goal 1, OT) short term goal (STG);2 weeks  -RB     Progress/Outcome (ROM Goal 1, OT) goal met  -RB           User Key  (r) = Recorded By, (t) = Taken By, (c) = Cosigned By    Initials Name Provider Type    Remedios Dominique OT Occupational Therapist               Clinical Impression     Row Name 04/14/23 1518          Pain Assessment    Pretreatment Pain Rating 10/10  -RB     Posttreatment Pain Rating 5/10  -RB     Pain Location - Side/Orientation Left  -RB     Pain Location - shoulder  -RB     Row Name 04/14/23 1518          Plan of Care Review    Plan of Care Reviewed With patient;spouse  -RB     Progress no change  -RB     Row Name 04/14/23 1518          Therapy Assessment/Plan (OT)    Criteria for Skilled Therapeutic Interventions Met (OT) no;other (see comments)  D/C  -RB     Row Name 04/14/23 1518          Therapy Plan Review/Discharge Plan (OT)    Anticipated Discharge Disposition (OT) home with outpatient therapy services  -RB     Row Name 04/14/23 1518          Vital Signs    Pre Patient Position Supine  -RB     Intra Patient Position Standing   -RB     Post Patient Position Sitting  -RB     Row Name 04/14/23 1518          Positioning and Restraints    Pre-Treatment Position in bed  -RB     Post Treatment Position chair  -RB           User Key  (r) = Recorded By, (t) = Taken By, (c) = Cosigned By    Initials Name Provider Type    Remedios Dominique OT Occupational Therapist               Outcome Measures     Row Name 04/14/23 1519          How much help from another is currently needed...    Putting on and taking off regular lower body clothing? 3  -RB     Bathing (including washing, rinsing, and drying) 3  -RB     Toileting (which includes using toilet bed pan or urinal) 3  -RB     Putting on and taking off regular upper body clothing 3  -RB     Taking care of personal grooming (such as brushing teeth) 4  -RB     Eating meals 4  -RB     AM-PAC 6 Clicks Score (OT) 20  -RB     Row Name 04/14/23 1519          Modified Wilmont Scale    Modified Wilmont Scale 3 - Moderate disability.  Requiring some help, but able to walk without assistance.  -RB     Row Name 04/14/23 1519          Functional Assessment    Outcome Measure Options AM-PAC 6 Clicks Daily Activity (OT);Modified Sergio  -RB           User Key  (r) = Recorded By, (t) = Taken By, (c) = Cosigned By    Initials Name Provider Type    Remedios Dominique OT Occupational Therapist              Occupational Therapy Education     Title: PT OT SLP Therapies (Not Started)     Topic: Occupational Therapy (Not Started)     Point: ADL training (Not Started)     Description:   Instruct learner(s) on proper safety adaptation and remediation techniques during self care or transfers.   Instruct in proper use of assistive devices.              Learner Progress:  Not documented in this visit.          Point: Home exercise program (Not Started)     Description:   Instruct learner(s) on appropriate technique for monitoring, assisting and/or progressing therapeutic exercises/activities.              Learner Progress:   Not documented in this visit.          Point: Precautions (Not Started)     Description:   Instruct learner(s) on prescribed precautions during self-care and functional transfers.              Learner Progress:  Not documented in this visit.          Point: Body mechanics (Not Started)     Description:   Instruct learner(s) on proper positioning and spine alignment during self-care, functional mobility activities and/or exercises.              Learner Progress:  Not documented in this visit.                          OT Recommendation and Plan     Plan of Care Review  Plan of Care Reviewed With: patient, spouse  Progress: no change  Plan of Care Reviewed With: patient, spouse     Time Calculation:    Time Calculation- OT     Row Name 04/14/23 1504             Time Calculation- OT    OT Start Time 1430  -RB      OT Stop Time 1501  -RB      OT Time Calculation (min) 31 min  -RB      Total Timed Code Minutes- OT 24 minute(s)  -RB      OT Received On 04/14/23  -RB         Timed Charges    03657 - OT Therapeutic Exercise Minutes 10  -RB      18126 - OT Self Care/Mgmt Minutes 14  -RB         Untimed Charges    OT Eval/Re-eval Minutes 7  -RB         Total Minutes    Timed Charges Total Minutes 24  -RB      Untimed Charges Total Minutes 7  -RB       Total Minutes 31  -RB            User Key  (r) = Recorded By, (t) = Taken By, (c) = Cosigned By    Initials Name Provider Type    RB Remedios Zarate OT Occupational Therapist              Therapy Charges for Today     Code Description Service Date Service Provider Modifiers Qty    04941365214  OT THER PROC EA 15 MIN 4/14/2023 Remedios Zarate OT GO 1    12960282814 HC OT SELF CARE/MGMT/TRAIN EA 15 MIN 4/14/2023 Remedios Zarate OT GO 1    [id number]  OT EVAL LOW COMPLEXITY 2 4/14/2023 Remedios Zarate OT GO 1             OT Discharge Summary  Anticipated Discharge Disposition (OT): home with outpatient therapy services    Remedios Zarate OT  4/14/2023

## 2023-04-14 NOTE — BRIEF OP NOTE
TOTAL SHOULDER ARTHROPLASTY  Progress Note    Mary Early  4/14/2023    Pre-op Diagnosis:   Left shoulder osteoarthritis, biceps tendinosis       Post-Op Diagnosis Codes:  Left shoulder osteoarthritis, biceps tendinosis    Procedure/CPT® Codes:        Procedure(s):  Left shoulder reverse shoulder replacement with open proximal biceps tenodesis        Surgeon(s):  Amish Scales MD    Anesthesia: General with Block    Staff:   Circulator: Jordana Najera RN  Scrub Person: Yvette Yuan  Vendor Representative: Julian Malave  Assistant: Keri Le APRN  Assistant: Keri Le APRN      Estimated Blood Loss: 200 mL    Urine Voided: * No values recorded between 4/14/2023  8:42 AM and 4/14/2023 11:16 AM *    Specimens:                None          Drains: * No LDAs found *    Findings: See dictated op report patient        Complications: None apparent at time of dictation    Assistant: Keri Le APRN  was responsible for performing the following activities: Retraction, Suction, Irrigation, Suturing, Closing and Placing Dressing and their skilled assistance was necessary for the success of this case.    Amish Scales MD     Date: 4/14/2023  Time: 11:42 EDT

## 2023-04-14 NOTE — PLAN OF CARE
The pt is POD 0 from a L total shoulder arthroplasty. OT worked with the pt post op on sling management, HEP to begin POD 1, and technique for dressing. The pt's pain was managed and she was steady on her feet in prep for a d/c home. She plans to f/u with OP PT.

## 2023-04-14 NOTE — ANESTHESIA PREPROCEDURE EVALUATION
Anesthesia Evaluation     NPO Solid Status: > 8 hours  NPO Liquid Status: > 8 hours           Airway   Mallampati: III  TM distance: >3 FB  No difficulty expected  Dental          Pulmonary    Cardiovascular   Exercise tolerance: good (4-7 METS)    (+) hypertension, hyperlipidemia,       Neuro/Psych  (+) headaches, psychiatric history,    GI/Hepatic/Renal/Endo    (+) morbid obesity,      Musculoskeletal     Abdominal    Substance History      OB/GYN          Other   arthritis,                    Anesthesia Plan    ASA 3     general     (Regional for [facility] PSR)  intravenous induction     Anesthetic plan, risks, benefits, and alternatives have been provided, discussed and informed consent has been obtained with: patient.    Plan discussed with CRNA.        CODE STATUS:

## 2023-04-14 NOTE — ANESTHESIA PROCEDURE NOTES
Airway  Urgency: elective    Date/Time: 4/14/2023 8:53 AM  Airway not difficult    General Information and Staff    Patient location during procedure: OR  Anesthesiologist: Chandrakant Mejia MD  CRNA/CAA: Lucrecia Ramesh CRNA    Indications and Patient Condition  Indications for airway management: airway protection    Preoxygenated: yes  Mask difficulty assessment: 2 - vent by mask + OA or adjuvant +/- NMBA    Final Airway Details  Final airway type: endotracheal airway      Successful airway: ETT  Cuffed: yes   Successful intubation technique: direct laryngoscopy  Facilitating devices/methods: Bougie  Endotracheal tube insertion site: oral  Blade: Peters  Blade size: 2  ETT size (mm): 7.0  Cormack-Lehane Classification: grade IIa - partial view of glottis  Placement verified by: capnometry   Measured from: lips  Number of attempts at approach: 1  Assessment: lips, teeth, and gum same as pre-op and atraumatic intubation

## 2023-04-17 ENCOUNTER — TELEPHONE (OUTPATIENT)
Dept: ORTHOPEDIC SURGERY | Facility: HOSPITAL | Age: 53
End: 2023-04-17
Payer: COMMERCIAL

## 2023-04-17 NOTE — TELEPHONE ENCOUNTER
Attempted to reach Ms. Early to see how she is doing as she she is POD 3 LTS, unable to leave message at this time.

## 2023-04-24 ENCOUNTER — TELEPHONE (OUTPATIENT)
Dept: ORTHOPEDIC SURGERY | Facility: HOSPITAL | Age: 53
End: 2023-04-24
Payer: COMMERCIAL

## 2023-04-24 NOTE — TELEPHONE ENCOUNTER
Attempted to reach Ms. Early at this time to see how she is doing as she is 2 weeks SP LTS.  Message left at this time.

## 2024-02-14 ENCOUNTER — OFFICE VISIT (OUTPATIENT)
Dept: CARDIOLOGY | Facility: CLINIC | Age: 54
End: 2024-02-14
Payer: COMMERCIAL

## 2024-02-14 VITALS
HEART RATE: 77 BPM | BODY MASS INDEX: 46.56 KG/M2 | HEIGHT: 62 IN | WEIGHT: 253 LBS | DIASTOLIC BLOOD PRESSURE: 80 MMHG | SYSTOLIC BLOOD PRESSURE: 114 MMHG

## 2024-02-14 DIAGNOSIS — R06.02 SHORTNESS OF BREATH: Primary | ICD-10-CM

## 2024-02-14 DIAGNOSIS — I10 PRIMARY HYPERTENSION: ICD-10-CM

## 2024-02-14 DIAGNOSIS — R42 DIZZINESS: ICD-10-CM

## 2024-02-14 DIAGNOSIS — E78.5 HYPERLIPIDEMIA, UNSPECIFIED HYPERLIPIDEMIA TYPE: ICD-10-CM

## 2024-02-14 PROCEDURE — 99214 OFFICE O/P EST MOD 30 MIN: CPT | Performed by: NURSE PRACTITIONER

## 2024-02-14 PROCEDURE — 3074F SYST BP LT 130 MM HG: CPT | Performed by: NURSE PRACTITIONER

## 2024-02-14 PROCEDURE — 3079F DIAST BP 80-89 MM HG: CPT | Performed by: NURSE PRACTITIONER

## 2024-02-14 PROCEDURE — 93000 ELECTROCARDIOGRAM COMPLETE: CPT | Performed by: NURSE PRACTITIONER

## 2024-02-14 RX ORDER — CEPHALEXIN 250 MG/1
250 CAPSULE ORAL DAILY
COMMUNITY
Start: 2024-02-02

## 2024-02-14 RX ORDER — LEVOCETIRIZINE DIHYDROCHLORIDE 5 MG/1
5 TABLET, FILM COATED ORAL EVERY EVENING
COMMUNITY
Start: 2024-02-01

## 2024-02-14 RX ORDER — ALBUTEROL SULFATE 90 UG/1
AEROSOL, METERED RESPIRATORY (INHALATION)
COMMUNITY

## 2024-02-20 ENCOUNTER — HOSPITAL ENCOUNTER (OUTPATIENT)
Dept: CARDIOLOGY | Facility: HOSPITAL | Age: 54
Discharge: HOME | End: 2024-02-20
Admitting: NURSE PRACTITIONER
Payer: COMMERCIAL

## 2024-02-20 VITALS — HEIGHT: 62 IN | BODY MASS INDEX: 46.56 KG/M2 | WEIGHT: 253 LBS

## 2024-02-20 PROCEDURE — 93306 TTE W/DOPPLER COMPLETE: CPT

## 2024-02-20 PROCEDURE — 93306 TTE W/DOPPLER COMPLETE: CPT | Performed by: INTERNAL MEDICINE

## 2024-02-21 LAB
AORTIC DIMENSIONLESS INDEX: 0.8 (DI)
ASCENDING AORTA: 3.5 CM
BH CV ECHO MEAS - ACS: 2.3 CM
BH CV ECHO MEAS - AO MAX PG: 7.8 MMHG
BH CV ECHO MEAS - AO MEAN PG: 4 MMHG
BH CV ECHO MEAS - AO ROOT DIAM: 2.9 CM
BH CV ECHO MEAS - AO V2 MAX: 140 CM/SEC
BH CV ECHO MEAS - AO V2 VTI: 26.3 CM
BH CV ECHO MEAS - AVA(I,D): 2.7 CM2
BH CV ECHO MEAS - EDV(CUBED): 97.3 ML
BH CV ECHO MEAS - EDV(MOD-SP2): 83 ML
BH CV ECHO MEAS - EDV(MOD-SP4): 105 ML
BH CV ECHO MEAS - EF(MOD-BP): 60.4 %
BH CV ECHO MEAS - EF(MOD-SP2): 62.7 %
BH CV ECHO MEAS - EF(MOD-SP4): 61 %
BH CV ECHO MEAS - ESV(CUBED): 35.9 ML
BH CV ECHO MEAS - ESV(MOD-SP2): 31 ML
BH CV ECHO MEAS - ESV(MOD-SP4): 41 ML
BH CV ECHO MEAS - FS: 28.3 %
BH CV ECHO MEAS - IVS/LVPW: 1.05 CM
BH CV ECHO MEAS - IVSD: 1.1 CM
BH CV ECHO MEAS - LAT PEAK E' VEL: 7.9 CM/SEC
BH CV ECHO MEAS - LV MASS(C)D: 175.5 GRAMS
BH CV ECHO MEAS - LV MAX PG: 4.9 MMHG
BH CV ECHO MEAS - LV MEAN PG: 2 MMHG
BH CV ECHO MEAS - LV V1 MAX: 111 CM/SEC
BH CV ECHO MEAS - LV V1 VTI: 20.7 CM
BH CV ECHO MEAS - LVIDD: 4.6 CM
BH CV ECHO MEAS - LVIDS: 3.3 CM
BH CV ECHO MEAS - LVOT AREA: 3.5 CM2
BH CV ECHO MEAS - LVOT DIAM: 2.1 CM
BH CV ECHO MEAS - LVPWD: 1.05 CM
BH CV ECHO MEAS - MED PEAK E' VEL: 10.2 CM/SEC
BH CV ECHO MEAS - MV A DUR: 0.13 SEC
BH CV ECHO MEAS - MV A MAX VEL: 55.3 CM/SEC
BH CV ECHO MEAS - MV DEC SLOPE: 367 CM/SEC2
BH CV ECHO MEAS - MV DEC TIME: 209 SEC
BH CV ECHO MEAS - MV E MAX VEL: 65 CM/SEC
BH CV ECHO MEAS - MV E/A: 1.18
BH CV ECHO MEAS - MV MAX PG: 2.8 MMHG
BH CV ECHO MEAS - MV MEAN PG: 1 MMHG
BH CV ECHO MEAS - MV P1/2T: 70.6 MSEC
BH CV ECHO MEAS - MV V2 VTI: 23.8 CM
BH CV ECHO MEAS - MVA(P1/2T): 3.1 CM2
BH CV ECHO MEAS - MVA(VTI): 3 CM2
BH CV ECHO MEAS - PA ACC TIME: 0.14 SEC
BH CV ECHO MEAS - PA V2 MAX: 108 CM/SEC
BH CV ECHO MEAS - PULM A REVS DUR: 0.16 SEC
BH CV ECHO MEAS - PULM A REVS VEL: 25.7 CM/SEC
BH CV ECHO MEAS - PULM DIAS VEL: 39.4 CM/SEC
BH CV ECHO MEAS - PULM S/D: 1.6
BH CV ECHO MEAS - PULM SYS VEL: 62.9 CM/SEC
BH CV ECHO MEAS - QP/QS: 0.78
BH CV ECHO MEAS - RAP SYSTOLE: 8 MMHG
BH CV ECHO MEAS - RV MAX PG: 1.4 MMHG
BH CV ECHO MEAS - RV V1 MAX: 59.1 CM/SEC
BH CV ECHO MEAS - RV V1 VTI: 11.4 CM
BH CV ECHO MEAS - RVOT DIAM: 2.5 CM
BH CV ECHO MEAS - SV(LVOT): 71.7 ML
BH CV ECHO MEAS - SV(MOD-SP2): 52 ML
BH CV ECHO MEAS - SV(MOD-SP4): 64 ML
BH CV ECHO MEAS - SV(RVOT): 56 ML
BH CV ECHO MEAS - TAPSE (>1.6): 2.7 CM
BH CV ECHO MEASUREMENTS AVERAGE E/E' RATIO: 7.18
BH CV VAS BP LEFT ARM: NORMAL MMHG
BH CV XLRA - RV BASE: 3.7 CM
BH CV XLRA - RV LENGTH: 7.3 CM
BH CV XLRA - RV MID: 3.6 CM
BH CV XLRA - TDI S': 15.9 CM/SEC
SINUS: 3.1 CM
STJ: 3.2 CM

## 2024-03-15 ENCOUNTER — OFFICE VISIT (OUTPATIENT)
Dept: CARDIOLOGY | Facility: CLINIC | Age: 54
End: 2024-03-15
Payer: COMMERCIAL

## 2024-03-15 VITALS
DIASTOLIC BLOOD PRESSURE: 70 MMHG | HEART RATE: 80 BPM | BODY MASS INDEX: 47.29 KG/M2 | HEIGHT: 62 IN | SYSTOLIC BLOOD PRESSURE: 115 MMHG | WEIGHT: 257 LBS

## 2024-03-15 DIAGNOSIS — R42 DIZZINESS: ICD-10-CM

## 2024-03-15 DIAGNOSIS — I77.810 MILD ASCENDING AORTA DILATION: Primary | ICD-10-CM

## 2024-03-15 DIAGNOSIS — R06.02 SHORTNESS OF BREATH: ICD-10-CM

## 2024-03-15 DIAGNOSIS — I10 PRIMARY HYPERTENSION: ICD-10-CM

## 2024-03-15 DIAGNOSIS — I31.39 PERICARDIAL EFFUSION: ICD-10-CM

## 2024-03-15 PROCEDURE — 3078F DIAST BP <80 MM HG: CPT | Performed by: NURSE PRACTITIONER

## 2024-03-15 PROCEDURE — 3074F SYST BP LT 130 MM HG: CPT | Performed by: NURSE PRACTITIONER

## 2024-03-15 RX ORDER — BENZONATATE 200 MG/1
CAPSULE ORAL
COMMUNITY

## 2024-03-15 RX ORDER — ACETAMINOPHEN 500 MG
TABLET ORAL
COMMUNITY

## 2024-03-15 NOTE — PROGRESS NOTES
Subjective:        Mary Early is a 53 y.o. female who here for follow up    No chief complaint on file.      HPI  This is a 53-year-old female who is here today for test results.  She has a history of hypertension, arthritis, anxiety, hyperlipidemia and anemia.    Her echo February 2024 indicated EF 56 to 60%, normal LV function and mild dilation of the aortic root present.  Mild dilation of the sinuses of Valsalva.  Previous stress test in 2022 indicated a negative study.  Her monitor on 3/7/24 was a normal study.    The following portions of the patient's history were reviewed and updated as appropriate: allergies, current medications, past family history, past medical history, past social history, past surgical history and problem list.    Past Medical History:   Diagnosis Date    Anemia     Anxiety     Arthritis     Depression     Headache, tension-type     Hyperlipidemia     Hypertension          reports that she has never smoked. She has never used smokeless tobacco. She reports current alcohol use. She reports that she does not use drugs.     Family History   Problem Relation Age of Onset    Arthritis Mother     Alcohol abuse Mother     Heart disease Mother     Hypertension Mother     Hyperlipidemia Mother     Obesity Mother     Stroke Mother     Thyroid disease Mother     Alcohol abuse Father     Hypertension Father     Malig Hyperthermia Neg Hx        ROS     Review of Systems  Constitutional: No wt loss, fever, fatigue  Gastrointestinal: No nausea, abdominal pain  Behavioral/Psych: No insomnia or anxiety  Cardiovascular : Denies chest pain, and shortness of breath.      Objective:           Constitutional:       Appearance: Well-developed.   Neck:      Vascular: No JVD.      Trachea: No tracheal deviation.   Pulmonary:      Effort: Pulmonary effort is normal. No respiratory distress.      Breath sounds: Normal breath sounds. No stridor. No decreased breath sounds. No wheezing. No rhonchi. No rales.    Chest:      Chest wall: Not tender to palpatation.   Cardiovascular:      Normal rate. Regular rhythm.      No gallop.    Pulses:     Intact distal pulses.   Edema:     Peripheral edema absent.   Abdominal:      General: Bowel sounds are normal. There is no distension.      Palpations: Abdomen is soft.      Tenderness: There is no abdominal tenderness.   Skin:     General: Skin is warm.   Neurological:      Mental Status: Alert and oriented to person, place, and time.      Deep Tendon Reflexes: Reflexes are normal and symmetric.         Procedures     2/2024  Interpretation Summary         A normal monitor study.    Mary Early monitored for 7d 2h, analyzable time was 6d 21h starting on 02/28/24 3:33 PM.  Primary rhythm was Sinus Rhythm.  The average heart rate, excluding ectopy, was 90 BPM, Minium heart rate was 69 BPM  on Day 3 and a maximum of 139 BPM on Day 4. SVE: [place] was <0.01%, 74 total SVE(s). SVT:1 events, longest event 3 beats on Day 2, fastest  event 121 BPM on Day 2. PVC: [place] was <0.1%, 19 total PVC(s). 2 disparate morphologies. VT: 1 events, longest even 3 beats at day 7.Fastest event 158 bpm at day 7.    NSR, OCCASIONAL PACS AND PVCS    2024 Interpretation Summary         Left ventricular ejection fraction appears to be 56 - 60%.    Left ventricular diastolic function was normal.    Mild dilation of the aortic root is present. Mild dilation of the sinuses of Valsalva is present.     2022 Interpretation Summary         No ECG evidence of myocardial ischemia.    Negative clinical evidence of myocardial ischemia.    Findings consistent with a normal ECG stress test.    No ECG evidence of myocardial ischemia. Negative clinical evidence of myocardial ischemia. Findings consistent with a normal ECG stress test. Asymptomatic for chest pain. ECG is negative for ischemia.     Asymptomatic for chest pain. ECG is negative for ischemia.   Ectopy: couplet in stress  B/P is appropriate. Exercise  tolerance is poor  Supervised by:  Sarah GOMES.       Current Outpatient Medications:     amLODIPine-benazepril (LOTREL) 10-20 MG per capsule, Take 1 capsule by mouth Daily., Disp: , Rfl:     B Complex-C-E-Zn (b complex-C-E-zinc) tablet, Take 1 tablet by mouth Daily. HOLD PER MD INSTR, Disp: , Rfl:     baclofen (LIORESAL) 10 MG tablet, TAKE 1 TABLET BY MOUTH THREE TIMES DAILY AS NEEDED FOR MUSCLE SPASMS. MAY CAUSE DROWSINESS, Disp: , Rfl:     cephalexin (KEFLEX) 250 MG capsule, Take 1 capsule by mouth Daily., Disp: , Rfl:     cholecalciferol (VITAMIN D3) 25 MCG (1000 UT) tablet, Take 1 tablet by mouth Daily., Disp: , Rfl:     Estradiol-Norethindrone Acet 0.5-0.1 MG per tablet, Take 1 tablet by mouth Daily., Disp: , Rfl:     HYDROcodone-acetaminophen (NORCO)  MG per tablet, Take 1 tablet by mouth Every 4 (Four) Hours As Needed for Moderate Pain., Disp: 40 tablet, Rfl: 0    levocetirizine (XYZAL) 5 MG tablet, Take 1 tablet by mouth Every Evening., Disp: , Rfl:     naloxone (NARCAN) 4 MG/0.1ML nasal spray, CALL 911. Do not prime. Spray into nostril upon signs of opioid overdose. May repeat in 2 to 3 minutes in opposite nostril if no or minimal breathing and responsiveness, then as needed (if doses are available) every 2 to 3 minutes., Disp: 2 each, Rfl: 0    ondansetron ODT (ZOFRAN-ODT) 8 MG disintegrating tablet, Place 1 tablet on the tongue Every 8 (Eight) Hours As Needed for Nausea or Vomiting., Disp: 30 tablet, Rfl: 0    triamterene-hydrochlorothiazide (DYAZIDE) 37.5-25 MG per capsule, Take 1 capsule by mouth Daily., Disp: , Rfl:     venlafaxine XR (EFFEXOR-XR) 150 MG 24 hr capsule, Take 225 mg by mouth Daily., Disp: , Rfl:     Ventolin  (90 Base) MCG/ACT inhaler, Ventolin HFA 90 mcg/actuation aerosol inhaler, Disp: , Rfl:      Assessment:        Patient Active Problem List   Diagnosis    Pericardial effusion    Abnormal CT of the chest    Primary hypertension               Plan:   1.  Test  results for shortness of breath/dizziness: Of echo with normal EF and LV function.  Mild dilation of the aortic root present.  Mild dilation of sinuses of Valsalva noted.  Holter monitor was a negative study.  She will follow-up with her primary care physician for other reasons of shortness of breath. Shortness of breath and dizziness has improved.    2.  Hyperlipidemia: She states her primary care manages her cholesterol labs.    Risk of the hyperlipidemia, importance of the treatment has been explained. Pros and cons of the statins has been explained. Regular blood workup as well as side effects including the liver failure, myelopathy death has been explained.      No diagnosis found.    There are no diagnoses linked to this encounter.    COUNSELING: faith Lamberteling was given to patient for the following topics: diagnostic results, risk factor reductions, impressions, risks and benefits of treatment options and importance of treatment compliance .       SMOKING COUNSELING: denies    She will follow up in one year with CT due to Mild dilation of the aortic root present as shown on echo.     Sincerely,   MIREYA Cook  [facility]  03/15/24  09:19 EDT    EMR Dragon/Transcription disclaimer: Dictated utilizing Dragon Dictation

## 2024-03-21 ENCOUNTER — APPOINTMENT (OUTPATIENT)
Dept: CT IMAGING | Facility: HOSPITAL | Age: 54
End: 2024-03-21
Payer: COMMERCIAL

## 2024-03-21 ENCOUNTER — HOSPITAL ENCOUNTER (EMERGENCY)
Facility: HOSPITAL | Age: 54
Discharge: HOME | End: 2024-03-21
Attending: EMERGENCY MEDICINE
Payer: COMMERCIAL

## 2024-03-21 ENCOUNTER — APPOINTMENT (OUTPATIENT)
Dept: GENERAL RADIOLOGY | Facility: HOSPITAL | Age: 54
End: 2024-03-21
Payer: COMMERCIAL

## 2024-03-21 VITALS
WEIGHT: 257.94 LBS | HEIGHT: 62 IN | DIASTOLIC BLOOD PRESSURE: 76 MMHG | TEMPERATURE: 98.9 F | SYSTOLIC BLOOD PRESSURE: 146 MMHG | HEART RATE: 78 BPM | BODY MASS INDEX: 47.47 KG/M2 | OXYGEN SATURATION: 98 % | RESPIRATION RATE: 18 BRPM

## 2024-03-21 DIAGNOSIS — R51.9 ACUTE NONINTRACTABLE HEADACHE, UNSPECIFIED HEADACHE TYPE: Primary | ICD-10-CM

## 2024-03-21 DIAGNOSIS — R11.0 NAUSEA: ICD-10-CM

## 2024-03-21 DIAGNOSIS — D64.9 ANEMIA, UNSPECIFIED TYPE: ICD-10-CM

## 2024-03-21 DIAGNOSIS — R07.9 CHEST PAIN, UNSPECIFIED TYPE: ICD-10-CM

## 2024-03-21 LAB
ALBUMIN SERPL-MCNC: 3.8 G/DL (ref 3.5–5.2)
ALBUMIN/GLOB SERPL: 1.2 G/DL
ALP SERPL-CCNC: 116 U/L (ref 39–117)
ALT SERPL W P-5'-P-CCNC: 21 U/L (ref 1–33)
ANION GAP SERPL CALCULATED.3IONS-SCNC: 11.3 MMOL/L (ref 5–15)
AST SERPL-CCNC: 16 U/L (ref 1–32)
BILIRUB SERPL-MCNC: <0.2 MG/DL (ref 0–1.2)
BILIRUB UR QL STRIP: NEGATIVE
BUN SERPL-MCNC: 18 MG/DL (ref 6–20)
BUN/CREAT SERPL: 21.2 (ref 7–25)
CALCIUM SPEC-SCNC: 9.8 MG/DL (ref 8.6–10.5)
CHLORIDE SERPL-SCNC: 104 MMOL/L (ref 98–107)
CLARITY UR: CLEAR
CO2 SERPL-SCNC: 24.7 MMOL/L (ref 22–29)
COLOR UR: YELLOW
CREAT SERPL-MCNC: 0.85 MG/DL (ref 0.57–1)
D DIMER PPP FEU-MCNC: 0.51 MCGFEU/ML (ref 0–0.55)
DEPRECATED RDW RBC AUTO: 41.5 FL (ref 37–54)
EGFRCR SERPLBLD CKD-EPI 2021: 81 ML/MIN/1.73
EOSINOPHIL # BLD MANUAL: 0.61 10*3/MM3 (ref 0–0.4)
EOSINOPHIL NFR BLD MANUAL: 6.3 % (ref 0.3–6.2)
ERYTHROCYTE [DISTWIDTH] IN BLOOD BY AUTOMATED COUNT: 15.7 % (ref 12.3–15.4)
GEN 5 2HR TROPONIN T REFLEX: <6 NG/L
GLOBULIN UR ELPH-MCNC: 3.3 GM/DL
GLUCOSE SERPL-MCNC: 100 MG/DL (ref 65–99)
GLUCOSE UR STRIP-MCNC: NEGATIVE MG/DL
HCT VFR BLD AUTO: 35.8 % (ref 34–46.6)
HGB BLD-MCNC: 11.5 G/DL (ref 12–15.9)
HGB UR QL STRIP.AUTO: NEGATIVE
HOLD SPECIMEN: NORMAL
HOLD SPECIMEN: NORMAL
KETONES UR QL STRIP: NEGATIVE
LEUKOCYTE ESTERASE UR QL STRIP.AUTO: NEGATIVE
LIPASE SERPL-CCNC: 30 U/L (ref 13–60)
LYMPHOCYTES # BLD MANUAL: 1.11 10*3/MM3 (ref 0.7–3.1)
LYMPHOCYTES NFR BLD MANUAL: 4.2 % (ref 5–12)
MCH RBC QN AUTO: 23.8 PG (ref 26.6–33)
MCHC RBC AUTO-ENTMCNC: 32.1 G/DL (ref 31.5–35.7)
MCV RBC AUTO: 74 FL (ref 79–97)
MONOCYTES # BLD: 0.4 10*3/MM3 (ref 0.1–0.9)
NEUTROPHILS # BLD AUTO: 7.52 10*3/MM3 (ref 1.7–7)
NEUTROPHILS NFR BLD MANUAL: 78.1 % (ref 42.7–76)
NITRITE UR QL STRIP: NEGATIVE
NRBC BLD AUTO-RTO: 0 /100 WBC (ref 0–0.2)
PH UR STRIP.AUTO: 6.5 [PH] (ref 5–8)
PLAT MORPH BLD: NORMAL
PLATELET # BLD AUTO: 339 10*3/MM3 (ref 140–450)
PMV BLD AUTO: 9.1 FL (ref 6–12)
POTASSIUM SERPL-SCNC: 4.3 MMOL/L (ref 3.5–5.2)
PROT SERPL-MCNC: 7.1 G/DL (ref 6–8.5)
PROT UR QL STRIP: NEGATIVE
QT INTERVAL: 406 MS
QTC INTERVAL: 439 MS
RBC # BLD AUTO: 4.84 10*6/MM3 (ref 3.77–5.28)
RBC MORPH BLD: NORMAL
SODIUM SERPL-SCNC: 140 MMOL/L (ref 136–145)
SP GR UR STRIP: 1.01 (ref 1–1.03)
TROPONIN T DELTA: NORMAL
TROPONIN T SERPL HS-MCNC: <6 NG/L
UROBILINOGEN UR QL STRIP: NORMAL
VARIANT LYMPHS NFR BLD MANUAL: 11.5 % (ref 19.6–45.3)
WBC MORPH BLD: NORMAL
WBC NRBC COR # BLD AUTO: 9.63 10*3/MM3 (ref 3.4–10.8)
WHOLE BLOOD HOLD COAG: NORMAL
WHOLE BLOOD HOLD SPECIMEN: NORMAL

## 2024-03-21 PROCEDURE — 81003 URINALYSIS AUTO W/O SCOPE: CPT | Performed by: EMERGENCY MEDICINE

## 2024-03-21 PROCEDURE — 99284 EMERGENCY DEPT VISIT MOD MDM: CPT

## 2024-03-21 PROCEDURE — 85025 COMPLETE CBC W/AUTO DIFF WBC: CPT | Performed by: EMERGENCY MEDICINE

## 2024-03-21 PROCEDURE — 96365 THER/PROPH/DIAG IV INF INIT: CPT

## 2024-03-21 PROCEDURE — 83690 ASSAY OF LIPASE: CPT | Performed by: EMERGENCY MEDICINE

## 2024-03-21 PROCEDURE — 25010000002 ONDANSETRON PER 1 MG: Performed by: EMERGENCY MEDICINE

## 2024-03-21 PROCEDURE — 85379 FIBRIN DEGRADATION QUANT: CPT | Performed by: EMERGENCY MEDICINE

## 2024-03-21 PROCEDURE — 93010 ELECTROCARDIOGRAM REPORT: CPT | Performed by: INTERNAL MEDICINE

## 2024-03-21 PROCEDURE — 71045 X-RAY EXAM CHEST 1 VIEW: CPT

## 2024-03-21 PROCEDURE — 25010000002 DIPHENHYDRAMINE PER 50 MG: Performed by: EMERGENCY MEDICINE

## 2024-03-21 PROCEDURE — 84484 ASSAY OF TROPONIN QUANT: CPT | Performed by: EMERGENCY MEDICINE

## 2024-03-21 PROCEDURE — 96361 HYDRATE IV INFUSION ADD-ON: CPT

## 2024-03-21 PROCEDURE — 93005 ELECTROCARDIOGRAM TRACING: CPT | Performed by: EMERGENCY MEDICINE

## 2024-03-21 PROCEDURE — 36415 COLL VENOUS BLD VENIPUNCTURE: CPT

## 2024-03-21 PROCEDURE — 25810000003 SODIUM CHLORIDE 0.9 % SOLUTION: Performed by: EMERGENCY MEDICINE

## 2024-03-21 PROCEDURE — 25010000002 MAGNESIUM SULFATE 2 GM/50ML SOLUTION: Performed by: EMERGENCY MEDICINE

## 2024-03-21 PROCEDURE — 80053 COMPREHEN METABOLIC PANEL: CPT | Performed by: EMERGENCY MEDICINE

## 2024-03-21 PROCEDURE — 70450 CT HEAD/BRAIN W/O DYE: CPT

## 2024-03-21 PROCEDURE — 25010000002 PROCHLORPERAZINE 10 MG/2ML SOLUTION: Performed by: EMERGENCY MEDICINE

## 2024-03-21 PROCEDURE — 85007 BL SMEAR W/DIFF WBC COUNT: CPT | Performed by: EMERGENCY MEDICINE

## 2024-03-21 PROCEDURE — 96375 TX/PRO/DX INJ NEW DRUG ADDON: CPT

## 2024-03-21 RX ORDER — MAGNESIUM SULFATE HEPTAHYDRATE 40 MG/ML
2 INJECTION, SOLUTION INTRAVENOUS ONCE
Status: COMPLETED | OUTPATIENT
Start: 2024-03-21 | End: 2024-03-21

## 2024-03-21 RX ORDER — SUCRALFATE 1 G/1
1 TABLET ORAL 4 TIMES DAILY
Qty: 20 TABLET | Refills: 0 | Status: SHIPPED | OUTPATIENT
Start: 2024-03-21

## 2024-03-21 RX ORDER — PROCHLORPERAZINE EDISYLATE 5 MG/ML
10 INJECTION INTRAMUSCULAR; INTRAVENOUS ONCE
Status: COMPLETED | OUTPATIENT
Start: 2024-03-21 | End: 2024-03-21

## 2024-03-21 RX ORDER — PANTOPRAZOLE SODIUM 40 MG/1
40 TABLET, DELAYED RELEASE ORAL DAILY
Qty: 14 TABLET | Refills: 0 | Status: SHIPPED | OUTPATIENT
Start: 2024-03-21 | End: 2024-04-04

## 2024-03-21 RX ORDER — MECLIZINE HYDROCHLORIDE 25 MG/1
25 TABLET ORAL ONCE
Status: COMPLETED | OUTPATIENT
Start: 2024-03-21 | End: 2024-03-21

## 2024-03-21 RX ORDER — ACETAMINOPHEN 500 MG
1000 TABLET ORAL ONCE
Status: COMPLETED | OUTPATIENT
Start: 2024-03-21 | End: 2024-03-21

## 2024-03-21 RX ORDER — DROPERIDOL 2.5 MG/ML
2.5 INJECTION, SOLUTION INTRAMUSCULAR; INTRAVENOUS ONCE
Status: DISCONTINUED | OUTPATIENT
Start: 2024-03-21 | End: 2024-03-21 | Stop reason: HOSPADM

## 2024-03-21 RX ORDER — ONDANSETRON 8 MG/1
8 TABLET, ORALLY DISINTEGRATING ORAL EVERY 8 HOURS PRN
Qty: 15 TABLET | Refills: 0 | Status: SHIPPED | OUTPATIENT
Start: 2024-03-21

## 2024-03-21 RX ORDER — SODIUM CHLORIDE 0.9 % (FLUSH) 0.9 %
10 SYRINGE (ML) INJECTION AS NEEDED
Status: DISCONTINUED | OUTPATIENT
Start: 2024-03-21 | End: 2024-03-21 | Stop reason: HOSPADM

## 2024-03-21 RX ORDER — ASPIRIN 325 MG
325 TABLET ORAL ONCE
Status: COMPLETED | OUTPATIENT
Start: 2024-03-21 | End: 2024-03-21

## 2024-03-21 RX ORDER — DIPHENHYDRAMINE HYDROCHLORIDE 50 MG/ML
25 INJECTION INTRAMUSCULAR; INTRAVENOUS ONCE
Status: COMPLETED | OUTPATIENT
Start: 2024-03-21 | End: 2024-03-21

## 2024-03-21 RX ORDER — ONDANSETRON 2 MG/ML
8 INJECTION INTRAMUSCULAR; INTRAVENOUS ONCE
Status: COMPLETED | OUTPATIENT
Start: 2024-03-21 | End: 2024-03-21

## 2024-03-21 RX ADMIN — PROCHLORPERAZINE EDISYLATE 10 MG: 5 INJECTION INTRAMUSCULAR; INTRAVENOUS at 12:11

## 2024-03-21 RX ADMIN — ASPIRIN 325 MG: 325 TABLET ORAL at 10:27

## 2024-03-21 RX ADMIN — DIPHENHYDRAMINE HYDROCHLORIDE 25 MG: 50 INJECTION, SOLUTION INTRAMUSCULAR; INTRAVENOUS at 12:12

## 2024-03-21 RX ADMIN — ONDANSETRON 8 MG: 2 INJECTION INTRAMUSCULAR; INTRAVENOUS at 12:09

## 2024-03-21 RX ADMIN — MAGNESIUM SULFATE HEPTAHYDRATE 2 G: 40 INJECTION, SOLUTION INTRAVENOUS at 12:14

## 2024-03-21 RX ADMIN — MECLIZINE HYDROCHLORIDE 25 MG: 25 TABLET ORAL at 12:08

## 2024-03-21 RX ADMIN — ACETAMINOPHEN 1000 MG: 500 TABLET ORAL at 12:07

## 2024-03-21 RX ADMIN — SODIUM CHLORIDE 1000 ML: 9 INJECTION, SOLUTION INTRAVENOUS at 12:10

## 2024-03-21 NOTE — ED PROVIDER NOTES
EMERGENCY DEPARTMENT ENCOUNTER  Room Number:  37/37  Date of encounter:  3/21/2024  PCP: Jaun Lee MD  Patient Care Team:  Jaun Lee MD as PCP - General (Internal Medicine)     HPI:  Context: Mary Early is a 53 y.o. female who presents to the ED c/o chief complaint of this pain.  Patient reports that she had a episode of stabbing well localized central chest pain that occurred at approximately 2 or 3 AM, pain lasted for minutes and then resolved.  Patient reports that she had another episode later on the day.  Patient reports that she has had several of these episodes, denies any side events, reports that she has had constant chest tightness since initial episode, chest tightness has improved.  Patient denies any aggravating or ameliorating factors, pain is not exertional, denies any radiation into her neck or extremities.  Patient does endorse some shortness of breath, has had some nausea, no vomiting, no diaphoresis.  Patient reports that she also developed headache, headache is frontal, dull, patient reports that her head is swimming.  Patient denies any vertigo, denies any double vision, no difficulty swallowing, no ringing or ears or hearing loss, no difficulty with balance or coordination.  No facial droop, no difficulty speech, no weakness or numbness.  Patient reports that she is currently being treated for a sinus bilateral ear infection and is on doxycycline.  Patient denies any cardiac history, does report that she is followed by cardiologist, has had a stress test in the past that was negative.  Patient reports history of hypertension, was previously on cholesterol medicine, no longer, patient not diabetic, non-smoker, does endorse family history of first-degree relative with early MI.No history of DVT or PE.  No recent hemoptysis.  No unilateral leg swelling.  The patient is not being treated for a malignancy.  Patient denies any recent major trauma, surgery, immobilization.      MEDICAL  HISTORY REVIEW  Reviewed in [facility]    PAST MEDICAL HISTORY  Active Ambulatory Problems     Diagnosis Date Noted    Pericardial effusion 2022    Abnormal CT of the chest 2022    Primary hypertension 2023     Resolved Ambulatory Problems     Diagnosis Date Noted    No Resolved Ambulatory Problems     Past Medical History:   Diagnosis Date    Anemia     Anxiety     Arthritis     Depression     Headache, tension-type     Hyperlipidemia     Hypertension        PAST SURGICAL HISTORY  Past Surgical History:   Procedure Laterality Date    BREAST BIOPSY       SECTION      1986,1991,1994    FOOT SURGERY Left     GALLBLADDER SURGERY      HYSTERECTOMY  2010    TOTAL SHOULDER ARTHROPLASTY Left 2023    Procedure: LEFT SHOULDER REPLACEMENT OPEN BICEPS TENODESIS;  Surgeon: Amish Scales MD;  Location: [facility] OR [facility];  Service: Orthopedics;  Laterality: Left;    WRIST ARTHROPLASTY Bilateral        FAMILY HISTORY  Family History   Problem Relation Age of Onset    Arthritis Mother     Alcohol abuse Mother     Heart disease Mother     Hypertension Mother     Hyperlipidemia Mother     Obesity Mother     Stroke Mother     Thyroid disease Mother     Alcohol abuse Father     Hypertension Father     Malig Hyperthermia Neg Hx        SOCIAL HISTORY  Social History     Socioeconomic History    Marital status:    Tobacco Use    Smoking status: Never    Smokeless tobacco: Never   Vaping Use    Vaping status: Never Used   Substance and Sexual Activity    Alcohol use: Yes     Alcohol/week: 0.0 - 1.0 standard drinks of alcohol    Drug use: Never    Sexual activity: Defer       ALLERGIES  Patient has no known allergies.    The patient's allergies have been reviewed    REVIEW OF SYSTEMS  All systems reviewed and negative except for those discussed in HPI.     PHYSICAL EXAM  I have reviewed the triage vital signs and nursing notes.  ED Triage Vitals [24 0950]   Temp Heart Rate Resp BP SpO2    98.9 °F (37.2 °C) 81 16 -- 99 %      Temp src Heart Rate Source Patient Position BP Location FiO2 (%)   Tympanic Monitor -- -- --       General: No acute distress.  HENT: NCAT, PERRL, Nares patent.  Eyes: no scleral icterus.  Neck: trachea midline, no ROM limitations.  CV: regular rhythm, regular rate.  Respiratory: normal effort, CTAB.  Abdomen: soft, nondistended, NTTP, no rebound tenderness, no guarding or rigidity.  Musculoskeletal: no deformity.  Neuro: Alert and oriented x3, face symmetric, no facial droop, eyebrows raise equally bilaterally, tongue midline, no dysarthria, no aphasia, extraocular motion intact, no nystagmus, visual acuity grossly normal, cranial nerves II through XII intact, moves all extremities well, 5 out of 5 strength in all extremities, sensation intact light touch all extremities, no ataxia, no tremor.  Skin: warm, dry.    NIHSS:  0-->Alert: keenly responsive  0-->Answers both questions correctly  0-->Performs both tasks correctly  0=normal  0=No visual loss  0=Normal symmetric movement  0-->No drift: limb holds 90 (or 45) degrees for full 10 secs  0-->No drift: limb holds 90 (or 45) degrees for full 10 secs  0-->No drift: limb holds 90 (or 45) degrees for full 10 secs  0-->No drift: limb holds 90 (or 45) degrees for full 10 secs  0=Absent  0=Normal; no sensory loss  0=No aphasia, normal  0=Normal  0=No abnormality  Total score: 0      LAB RESULTS  Recent Results (from the past 24 hour(s))   ECG 12 Lead ED Triage Standing Order; Chest Pain    Collection Time: 03/21/24  9:55 AM   Result Value Ref Range    QT Interval 406 ms    QTC Interval 439 ms   Comprehensive Metabolic Panel    Collection Time: 03/21/24 10:22 AM    Specimen: Blood   Result Value Ref Range    Glucose 100 (H) 65 - 99 mg/dL    BUN 18 6 - 20 mg/dL    Creatinine 0.85 0.57 - 1.00 mg/dL    Sodium 140 136 - 145 mmol/L    Potassium 4.3 3.5 - 5.2 mmol/L    Chloride 104 98 - 107 mmol/L    CO2 24.7 22.0 - 29.0 mmol/L    Calcium  9.8 8.6 - 10.5 mg/dL    Total Protein 7.1 6.0 - 8.5 g/dL    Albumin 3.8 3.5 - 5.2 g/dL    ALT (SGPT) 21 1 - 33 U/L    AST (SGOT) 16 1 - 32 U/L    Alkaline Phosphatase 116 39 - 117 U/L    Total Bilirubin <0.2 0.0 - 1.2 mg/dL    Globulin 3.3 gm/dL    A/G Ratio 1.2 g/dL    BUN/Creatinine Ratio 21.2 7.0 - 25.0    Anion Gap 11.3 5.0 - 15.0 mmol/L    eGFR 81.0 >60.0 mL/min/1.73   High Sensitivity Troponin T    Collection Time: 03/21/24 10:22 AM    Specimen: Blood   Result Value Ref Range    HS Troponin T <6 <14 ng/L   Green Top (Gel)    Collection Time: 03/21/24 10:22 AM   Result Value Ref Range    Extra Tube Hold for add-ons.    Lavender Top    Collection Time: 03/21/24 10:22 AM   Result Value Ref Range    Extra Tube hold for add-on    Gold Top - SST    Collection Time: 03/21/24 10:22 AM   Result Value Ref Range    Extra Tube Hold for add-ons.    Light Blue Top    Collection Time: 03/21/24 10:22 AM   Result Value Ref Range    Extra Tube Hold for add-ons.    CBC Auto Differential    Collection Time: 03/21/24 10:22 AM    Specimen: Blood   Result Value Ref Range    WBC 9.63 3.40 - 10.80 10*3/mm3    RBC 4.84 3.77 - 5.28 10*6/mm3    Hemoglobin 11.5 (L) 12.0 - 15.9 g/dL    Hematocrit 35.8 34.0 - 46.6 %    MCV 74.0 (L) 79.0 - 97.0 fL    MCH 23.8 (L) 26.6 - 33.0 pg    MCHC 32.1 31.5 - 35.7 g/dL    RDW 15.7 (H) 12.3 - 15.4 %    RDW-SD 41.5 37.0 - 54.0 fl    MPV 9.1 6.0 - 12.0 fL    Platelets 339 140 - 450 10*3/mm3    nRBC 0.0 0.0 - 0.2 /100 WBC   Manual Differential    Collection Time: 03/21/24 10:22 AM    Specimen: Blood   Result Value Ref Range    Neutrophil % 78.1 (H) 42.7 - 76.0 %    Lymphocyte % 11.5 (L) 19.6 - 45.3 %    Monocyte % 4.2 (L) 5.0 - 12.0 %    Eosinophil % 6.3 (H) 0.3 - 6.2 %    Neutrophils Absolute 7.52 (H) 1.70 - 7.00 10*3/mm3    Lymphocytes Absolute 1.11 0.70 - 3.10 10*3/mm3    Monocytes Absolute 0.40 0.10 - 0.90 10*3/mm3    Eosinophils Absolute 0.61 (H) 0.00 - 0.40 10*3/mm3    RBC Morphology Normal Normal     WBC Morphology Normal Normal    Platelet Morphology Normal Normal   Lipase    Collection Time: 03/21/24 10:22 AM    Specimen: Blood   Result Value Ref Range    Lipase 30 13 - 60 U/L   D-dimer, Quantitative    Collection Time: 03/21/24 10:22 AM    Specimen: Blood   Result Value Ref Range    D-Dimer, Quantitative 0.51 0.00 - 0.55 MCGFEU/mL   Urinalysis With Microscopic If Indicated (No Culture) - Urine, Clean Catch    Collection Time: 03/21/24 11:54 AM    Specimen: Urine, Clean Catch   Result Value Ref Range    Color, UA Yellow Yellow, Straw    Appearance, UA Clear Clear    pH, UA 6.5 5.0 - 8.0    Specific Gravity, UA 1.007 1.005 - 1.030    Glucose, UA Negative Negative    Ketones, UA Negative Negative    Bilirubin, UA Negative Negative    Blood, UA Negative Negative    Protein, UA Negative Negative    Leuk Esterase, UA Negative Negative    Nitrite, UA Negative Negative    Urobilinogen, UA 0.2 E.U./dL 0.2 - 1.0 E.U./dL   High Sensitivity Troponin T 2Hr    Collection Time: 03/21/24  1:04 PM    Specimen: Blood   Result Value Ref Range    HS Troponin T <6 <14 ng/L    Troponin T Delta         I ordered the above labs and reviewed the results.    RADIOLOGY  CT Head Without Contrast    Result Date: 3/21/2024  EMERGENCY CT SCAN OF THE HEAD WITHOUT CONTRAST ON 03/21/2024  CLINICAL HISTORY: Headache.  TECHNIQUE: Spiral CT images were obtained from the base of the skull to the vertex without intravenous contrast. The images were reformatted and submitted in 3 mm thick axial, sagittal and coronal CT sections with brain algorithm.  COMPARISON: This is correlated to an MRI of the brain on 07/09/2021.  FINDINGS: The brain parenchyma is normal in attenuation. The ventricles are normal in size. I see no focal mass effect. There is no midline shift. No extra-axial fluid collections are identified. There is no evidence of acute intracranial hemorrhage. This patient has had previous paranasal sinus surgery with bilateral uncinectomies  and partial ethmoidectomies. There is partial opacification of left frontal sinus and frontal recess and left anterior ethmoid sinus with fluid and tiny amount of fluid in the posterior right maxillary sinus. There is a 12 x 11 mm completely opacified posterior right ethmoid air cell, this was present on prior MRI of the brain 07/09/2021. This is without significant change in appearance. The remainder of the paranasal sinuses and mastoid air cells and middle ear cavities are clear. The calvarium and skull base are normal in appearance.      1. No acute intracranial abnormality is identified with no intracranial changes when compared to an MRI of the brain on 07/09/2021.  2. This patient has had previous paranasal sinus surgery with bilateral uncinectomies and partial ethmoidectomies and there is partial opacification of the left frontal sinus, left frontal recess and left anterior ethmoid cavity with fluid and mucosal thickening and tiny amount of fluid in the posterior right maxillary sinus. There is a stable 12 x 11 mm completely opacified posterior right ethmoid air cell and this finding is unchanged when compared to MRI of the brain 07/09/2021. The remainder of the head CT is normal.  Radiation dose reduction techniques were utilized, including automated exposure control and exposure modulation based on body size.       XR Chest 1 View    Result Date: 3/21/2024  XR CHEST 1 VW-  Clinical: Chest pain  COMPARISON 4/3/2023  FINDINGS: Cardiomediastinal silhouette is satisfactory in appearance. There is old granulomatous disease as before. No acute pulmonary process has developed.  CONCLUSION: No active disease of the chest  This report was finalized on 3/21/2024 11:25 AM by Dr. Pedro Luis Hartmann M.D on Workstation: [id number]       I ordered the above noted radiological studies. I reviewed the images and results. I agree with the radiologist interpretation.    PROCEDURES  Procedures    MEDICATIONS GIVEN IN  ER  Medications   sodium chloride 0.9 % flush 10 mL (has no administration in time range)   droperidol (INAPSINE) injection 2.5 mg (has no administration in time range)   aspirin tablet 325 mg (325 mg Oral Given 3/21/24 1027)   prochlorperazine (COMPAZINE) injection 10 mg (10 mg Intravenous Given 3/21/24 1211)   diphenhydrAMINE (BENADRYL) injection 25 mg (25 mg Intravenous Given 3/21/24 1212)   ondansetron (ZOFRAN) injection 8 mg (8 mg Intravenous Given 3/21/24 1209)   sodium chloride 0.9 % bolus 1,000 mL (1,000 mL Intravenous New Bag 3/21/24 1210)   magnesium sulfate 2g/50 mL (PREMIX) infusion (0 g Intravenous Stopped 3/21/24 1304)   acetaminophen (TYLENOL) tablet 1,000 mg (1,000 mg Oral Given 3/21/24 1207)   meclizine (ANTIVERT) tablet 25 mg (25 mg Oral Given 3/21/24 1208)       PROGRESS, DATA ANALYSIS, CONSULTS, AND MEDICAL DECISION MAKING  A complete history and physical exam have been performed.  All available laboratory and imaging results have been reviewed by myself prior to disposition.    MDM    After the initial H&P, I discussed pertinent information from history and physical exam with patient/family.  Discussed differential diagnosis.  Discussed plan for ED evaluation/workup/treatment.  All questions answered.  Patient/family is agreeable with plan.  ED Course as of 03/21/24 1427   u Mar 21, 2024   0950 My differential diagnosis for chest pain includes but is not limited to:  Muscle strain, costochondritis, myositis, pleurisy, rib fracture, intercostal neuritis, herpes zoster, tumor, myocardial infarction, coronary syndrome, unstable angina, angina, aortic dissection, mitral valve prolapse, pericarditis, palpitations, pulmonary embolus, pneumonia, pneumothorax, lung cancer, GERD, esophagitis, esophageal spasm     [JG]   1001 EKG independently viewed and contemporaneously interpreted by ED physician. Time: 9:55 AM.  Rate 70.  Interpretation: Normal sinus rhythm, normal axis, delayed R wave progression,  no acute ST changes. [JG]   1059 My differential diagnosis for headache includes but is not limited to:  Migraine, cluster, ischemic stroke, subarachnoid hemorrhage, intracranial hemorrhage, vascular malformation, cerebral aneurysm, vascular dissection, vasculitis, temporal arteritis, malignant hypertension, pheochromocytoma, cerebral venous thrombosis, preeclampsia; bacterial meningitis, viral meningitis, fungal meningitis, encephalitis, brain abscess, pleural empyema, sinusitis, dental infection, influenza, viral syndrome; carbon monoxide exposure, analgesic abuse, hypoglycemia; trigeminal neuralgia, postherpetic neuralgia, occipital neuralgia; subdural hematoma, concussion, musculoskeletal tension, cervical osteoarthritis; glaucoma, TMJ disease, pseudotumor cerebri, post LP headache, intracranial neoplasm, sleep apnea     [JG]   1157 Wells low risk, dimer negative, PE ruled out. [JG]   1215 HEART SCORE:    History #0  (Highly suspicious 2, Moderately suspicious 1, Slightly or non-suspicious 0)    ECG #0  (Significant ST depression 2,  Nonspecific repol disturbance 1, Normal 0)    Age #1  (> or = 65 2, 46-65 1,  < or = 45 0)    Risk factors #1  (hypercholesterolemia, HTN, DM, smoking, pos fam hx, obesity)  (> or = to 3 RF 2, 1 or 2 1, No risk factors 0)    Troponin #0  (> or = 3x normal limit 2, 1-3x normal limit 1, < or = Normal limit 0)    HEART Score Key:  Scores 0-3: 0.9-1.7% risk of adverse cardiac event. In the HEART Score study, these patients were discharged (0.99% in the retrospective study, 1.7% in the prospective study)  Scores 4-6: 12-16.6% risk of adverse cardiac event. In the HEART Score study, these patients were admitted to the hospital. (11.6% retrospective, 16.6% prospective)  Scores =7: 50-65% risk of adverse cardiac event. In the HEART Score study, these patients were candidates for early invasive measures. (65.2% retrospective, 50.1% prospective)      This patient's HEART score is 2     [JG]    1305 Patient reassessed, discussed ED workup and results to present.  Patient reports headache is improved, minimal at present, patient does wish for some additional headache medicine.  Pending repeat troponin as well as CT head imaging..  Patient is no questions or concerns at present.  Discussed [JG]   1331 I reviewed CT head imaging in PACS, no intracranial hemorrhage per my read. [JG]   1331 Phone call with radiologist.  I had previously reviewed the images. I discussed the patient, imaging, and their interpretation.  See dictated report for final interpretation.     [JG]   1423 ED workup is unremarkable and reassuring.  Chest pain is extremely atypical and more suspicious of acid reflux and anything else.  Heart score low risk.  Patient has normal neuroexam.  Patient is well-appearing appears appropriate for discharge with outpatient follow-up. [JG]   1424 Patient reassessed, discussed ED workup and results.  Discussed plan for discharge, need for close follow-up with primary care, discussed referral to neurology for further evaluation of headaches, discussed referral to cardiology for further evaluation of chest pain.   Given extensive discussion return precautions, discharging. [JG]      ED Course User Index  [JG] Joel Escobar MD       AS OF 14:27 EDT VITALS:    BP - 126/86  HR - 76  TEMP - 98.9 °F (37.2 °C) (Tympanic)  O2 SATS - 99%    DIAGNOSIS  Final diagnoses:   Acute nonintractable headache, unspecified headache type   Chest pain, unspecified type   Nausea   Anemia, unspecified type         DISPOSITION  DISCHARGE    Patient discharged in stable condition.    Reviewed implications of results, diagnosis, meds, responsibility to follow up, warning signs and symptoms of possible worsening, potential complications and reasons to return to ER.    Patient/Family voiced understanding of above instructions.    Discussed plan for discharge, as there is no emergent indication for admission. Patient referred  to primary care provider for BP management due to today's BP. Pt/family is agreeable and understands need for follow up and repeat testing.  Pt is aware that discharge does not mean that nothing is wrong but it indicates no emergency is present that requires admission and they must continue care with follow-up as given below or physician of their choice.     FOLLOW-UP  Jaun Lee MD  [address]  [place] [id number] [phone]    Schedule an appointment as soon as possible for a visit in 2 days  even if well    [facility]UR[facility]  [address]  [place] [address] [phone]  Schedule an appointment as soon as possible for a visit in 2 days      [facility] C[facility]  [address]  [place] [address] [phone]  Schedule an appointment as soon as possible for a visit in 2 days           Medication List        New Prescriptions      ondansetron ODT 8 MG disintegrating tablet  Commonly known as: ZOFRAN-ODT  Place 1 tablet on the tongue Every 8 (Eight) Hours As Needed for Nausea or Vomiting.     pantoprazole 40 MG EC tablet  Commonly known as: PROTONIX  Take 1 tablet by mouth Daily for 14 days.     sucralfate 1 g tablet  Commonly known as: CARAFATE  Take 1 tablet by mouth 4 (Four) Times a Day.               Where to Get Your Medications        These medications were sent to [facility] [id number] - [place], [place] - [address] [place] [address] AT [facility] - [phone]  - [phone] [place] [address]      Phone: [phone]   ondansetron ODT 8 MG disintegrating tablet  pantoprazole 40 MG EC tablet  sucralfate 1 g tablet            Joel Escobar MD  03/21/24 [id number]

## 2024-03-21 NOTE — ED TRIAGE NOTES
Pt was seen at dr chahal's office this am for abd problems - she was sent to ER for raipid HR to 130.  At triage her HR ranged .  She was recently taken off her BP meds and lasix

## 2024-03-22 DIAGNOSIS — R07.9 CHEST PAIN, UNSPECIFIED TYPE: Primary | ICD-10-CM

## 2024-04-29 ENCOUNTER — HOSPITAL ENCOUNTER (OUTPATIENT)
Dept: CARDIOLOGY | Facility: HOSPITAL | Age: 54
Discharge: HOME | End: 2024-04-29
Admitting: NURSE PRACTITIONER
Payer: COMMERCIAL

## 2024-04-29 DIAGNOSIS — R07.9 CHEST PAIN, UNSPECIFIED TYPE: ICD-10-CM

## 2024-04-29 PROCEDURE — 93017 CV STRESS TEST TRACING ONLY: CPT

## 2024-05-02 LAB
BH CV STRESS BP STAGE 1: NORMAL
BH CV STRESS BP STAGE 2: NORMAL
BH CV STRESS DURATION MIN STAGE 1: 3
BH CV STRESS DURATION MIN STAGE 2: 1
BH CV STRESS DURATION SEC STAGE 1: 0
BH CV STRESS GRADE STAGE 1: 10
BH CV STRESS GRADE STAGE 2: 12
BH CV STRESS HR STAGE 1: 144
BH CV STRESS HR STAGE 2: 148
BH CV STRESS METS STAGE 1: 4.6
BH CV STRESS METS STAGE 2: 5.2
BH CV STRESS PROTOCOL 1: NORMAL
BH CV STRESS RECOVERY BP: NORMAL MMHG
BH CV STRESS RECOVERY HR: 116 BPM
BH CV STRESS SPEED STAGE 1: 1.7
BH CV STRESS SPEED STAGE 2: 2.5
BH CV STRESS STAGE 1: 1
BH CV STRESS STAGE 2: 2
MAXIMAL PREDICTED HEART RATE: 165 BPM
PERCENT MAX PREDICTED HR: 89.7 %
STRESS BASELINE BP: NORMAL MMHG
STRESS BASELINE HR: 120 BPM
STRESS PERCENT HR: 106 %
STRESS POST ESTIMATED WORKLOAD: 5.3 METS
STRESS POST EXERCISE DUR MIN: 4 MIN
STRESS POST PEAK BP: NORMAL MMHG
STRESS POST PEAK HR: 148 BPM
STRESS TARGET HR: 140 BPM

## 2024-05-06 ENCOUNTER — TELEPHONE (OUTPATIENT)
Dept: CARDIOLOGY | Facility: CLINIC | Age: 54
End: 2024-05-06

## 2024-05-06 NOTE — TELEPHONE ENCOUNTER
Caller: Mary Early    Relationship to patient: Self    Best call back number: [phone] (home)      Type of visit: FOLLOW UP FOR TEST RESULTS    If rescheduling, when is the original appointment: 5.6.24     Additional notes:PT CALLED IN NEEDING TO R/S APPT TODAY DUE TO RUNNING A FEVER. NEXT VANI IS NOT UNTIL NOVEMBER, PLEASE ADVISE WHEN TO R/S AND CALL BACK TO OFFER APPT.

## 2024-05-10 ENCOUNTER — OFFICE VISIT (OUTPATIENT)
Dept: CARDIOLOGY | Facility: CLINIC | Age: 54
End: 2024-05-10
Payer: COMMERCIAL

## 2024-05-10 VITALS
SYSTOLIC BLOOD PRESSURE: 127 MMHG | DIASTOLIC BLOOD PRESSURE: 83 MMHG | HEIGHT: 62 IN | BODY MASS INDEX: 46.56 KG/M2 | WEIGHT: 253 LBS | HEART RATE: 92 BPM

## 2024-05-10 DIAGNOSIS — R07.9 CHEST PAIN, UNSPECIFIED TYPE: Primary | ICD-10-CM

## 2024-05-10 DIAGNOSIS — E78.5 HYPERLIPIDEMIA, UNSPECIFIED HYPERLIPIDEMIA TYPE: ICD-10-CM

## 2024-05-10 DIAGNOSIS — I10 PRIMARY HYPERTENSION: ICD-10-CM

## 2024-05-10 PROCEDURE — 1160F RVW MEDS BY RX/DR IN RCRD: CPT | Performed by: NURSE PRACTITIONER

## 2024-05-10 PROCEDURE — 3074F SYST BP LT 130 MM HG: CPT | Performed by: NURSE PRACTITIONER

## 2024-05-10 PROCEDURE — 3079F DIAST BP 80-89 MM HG: CPT | Performed by: NURSE PRACTITIONER

## 2024-05-10 PROCEDURE — 1159F MED LIST DOCD IN RCRD: CPT | Performed by: NURSE PRACTITIONER

## 2024-05-10 RX ORDER — FESOTERODINE FUMARATE 4 MG/1
TABLET, FILM COATED, EXTENDED RELEASE ORAL
COMMUNITY
Start: 2024-03-22

## 2024-05-10 RX ORDER — PROPRANOLOL HYDROCHLORIDE 10 MG/1
TABLET ORAL
COMMUNITY
Start: 2024-04-15

## 2024-05-10 RX ORDER — ESOMEPRAZOLE MAGNESIUM 40 MG/1
CAPSULE, DELAYED RELEASE ORAL
COMMUNITY
Start: 2024-02-18

## 2024-05-23 ENCOUNTER — HOSPITAL ENCOUNTER (OUTPATIENT)
Dept: CARDIOLOGY | Facility: HOSPITAL | Age: 54
Discharge: HOME | End: 2024-05-23
Payer: COMMERCIAL

## 2024-05-23 VITALS
HEART RATE: 104 BPM | SYSTOLIC BLOOD PRESSURE: 118 MMHG | WEIGHT: 253 LBS | BODY MASS INDEX: 46.56 KG/M2 | HEIGHT: 62 IN | OXYGEN SATURATION: 99 % | DIASTOLIC BLOOD PRESSURE: 79 MMHG

## 2024-05-23 LAB
BH CV IMMEDIATE POST TECH DATA BLOOD PRESSURE: NORMAL MMHG
BH CV IMMEDIATE POST TECH DATA HEART RATE: 133 BPM
BH CV REST NUCLEAR ISOTOPE DOSE: 10.5 MCI
BH CV STRESS BP STAGE 1: NORMAL
BH CV STRESS BP STAGE 2: NORMAL
BH CV STRESS DURATION MIN STAGE 1: 3
BH CV STRESS DURATION MIN STAGE 2: 2
BH CV STRESS DURATION SEC STAGE 1: 15
BH CV STRESS DURATION SEC STAGE 2: 3
BH CV STRESS GRADE STAGE 1: 10
BH CV STRESS GRADE STAGE 2: 12
BH CV STRESS HR STAGE 1: 130
BH CV STRESS HR STAGE 2: 143
BH CV STRESS METS STAGE 1: 4.6
BH CV STRESS METS STAGE 2: 5.3
BH CV STRESS NUCLEAR ISOTOPE DOSE: 32.1 MCI
BH CV STRESS PROTOCOL 1: NORMAL
BH CV STRESS RECOVERY BP: NORMAL MMHG
BH CV STRESS RECOVERY HR: 105 BPM
BH CV STRESS SPEED STAGE 1: 1.7
BH CV STRESS SPEED STAGE 2: 2.5
BH CV STRESS STAGE 1: 1
BH CV STRESS STAGE 2: 2
BH CV THREE MINUTE POST TECH DATA BLOOD PRESSURE: NORMAL MMHG
BH CV THREE MINUTE POST TECH DATA HEART RATE: 111 BPM
LV EF NUC BP: 60 %
MAXIMAL PREDICTED HEART RATE: 165 BPM
PERCENT MAX PREDICTED HR: 86.67 %
STRESS BASELINE BP: NORMAL MMHG
STRESS BASELINE HR: 104 BPM
STRESS O2 SAT REST: 99 %
STRESS PERCENT HR: 102 %
STRESS POST ESTIMATED WORKLOAD: 5.3 METS
STRESS POST EXERCISE DUR MIN: 5 MIN
STRESS POST EXERCISE DUR SEC: 18 SEC
STRESS POST PEAK BP: NORMAL MMHG
STRESS POST PEAK HR: 143 BPM
STRESS TARGET HR: 140 BPM

## 2024-05-23 PROCEDURE — A9500 TC99M SESTAMIBI: HCPCS | Performed by: INTERNAL MEDICINE

## 2024-05-23 PROCEDURE — 0 TECHNETIUM SESTAMIBI: Performed by: INTERNAL MEDICINE

## 2024-05-23 PROCEDURE — 93017 CV STRESS TEST TRACING ONLY: CPT

## 2024-05-23 PROCEDURE — 78452 HT MUSCLE IMAGE SPECT MULT: CPT

## 2024-05-23 RX ORDER — CEPHALEXIN 250 MG/1
CAPSULE ORAL
COMMUNITY
Start: 2024-05-15

## 2024-05-23 RX ORDER — FLUCONAZOLE 100 MG/1
TABLET ORAL
COMMUNITY
Start: 2024-05-15

## 2024-05-23 RX ADMIN — TECHNETIUM TC 99M SESTAMIBI 1 DOSE: 1 INJECTION INTRAVENOUS at 08:20

## 2024-05-23 RX ADMIN — TECHNETIUM TC 99M SESTAMIBI 1 DOSE: 1 INJECTION INTRAVENOUS at 11:10

## 2024-05-29 ENCOUNTER — OFFICE VISIT (OUTPATIENT)
Dept: CARDIOLOGY | Facility: CLINIC | Age: 54
End: 2024-05-29
Payer: COMMERCIAL

## 2024-05-29 VITALS
HEART RATE: 100 BPM | HEIGHT: 62 IN | DIASTOLIC BLOOD PRESSURE: 88 MMHG | SYSTOLIC BLOOD PRESSURE: 133 MMHG | BODY MASS INDEX: 46.19 KG/M2 | WEIGHT: 251 LBS

## 2024-05-29 DIAGNOSIS — I10 PRIMARY HYPERTENSION: ICD-10-CM

## 2024-05-29 DIAGNOSIS — E78.5 HYPERLIPIDEMIA, UNSPECIFIED HYPERLIPIDEMIA TYPE: Primary | ICD-10-CM

## 2024-05-29 PROCEDURE — 3079F DIAST BP 80-89 MM HG: CPT | Performed by: NURSE PRACTITIONER

## 2024-05-29 PROCEDURE — 3075F SYST BP GE 130 - 139MM HG: CPT | Performed by: NURSE PRACTITIONER

## 2024-05-29 PROCEDURE — 1160F RVW MEDS BY RX/DR IN RCRD: CPT | Performed by: NURSE PRACTITIONER

## 2024-05-29 PROCEDURE — 99214 OFFICE O/P EST MOD 30 MIN: CPT | Performed by: NURSE PRACTITIONER

## 2024-05-29 PROCEDURE — 1159F MED LIST DOCD IN RCRD: CPT | Performed by: NURSE PRACTITIONER

## 2024-05-29 NOTE — PROGRESS NOTES
Subjective:        Mary Early is a 53 y.o. female who here for follow up    No chief complaint on file.      HPI    Mary Early is a 53-year-old who is here today for test results.  She has a history of anemia, anxiety, family history of heart disease, hypertension and hyperlipidemia.  Her echo revealed EF 56 to 60% with LV diastolic function is normal.  Mild dilation of aortic root present.  Mild dilation of sinuses of Valsalva is present.  Stress test indicated a normal study.  Holter monitor in March 2024 was a normal study.    The following portions of the patient's history were reviewed and updated as appropriate: allergies, current medications, past family history, past medical history, past social history, past surgical history and problem list.    Past Medical History:   Diagnosis Date    Anemia     Anxiety     Arthritis     Depression     Headache, tension-type     Hyperlipidemia     Hypertension          reports that she has never smoked. She has never used smokeless tobacco. She reports current alcohol use. She reports that she does not use drugs.     Family History   Problem Relation Age of Onset    Arthritis Mother     Alcohol abuse Mother     Heart disease Mother     Hypertension Mother     Hyperlipidemia Mother     Obesity Mother     Stroke Mother     Thyroid disease Mother     Alcohol abuse Father     Hypertension Father     Malig Hyperthermia Neg Hx        ROS     Review of Systems  Constitutional: No wt loss, fever, fatigue  Gastrointestinal: No nausea, abdominal pain  Behavioral/Psych: No insomnia or anxiety  Cardiovascular : denies cp or shob comes and goes      Objective:           Constitutional:       Appearance: Well-developed.   Neck:      Vascular: No JVD.      Trachea: No tracheal deviation.   Pulmonary:      Effort: Pulmonary effort is normal. No respiratory distress.      Breath sounds: Normal breath sounds. No stridor. No decreased breath sounds. No wheezing. No rhonchi. No  rales.   Chest:      Chest wall: Not tender to palpatation.   Cardiovascular:      Normal rate. Regular rhythm.      No gallop.    Pulses:     Intact distal pulses.   Edema:     Peripheral edema absent.   Abdominal:      General: Bowel sounds are normal. There is no distension.      Palpations: Abdomen is soft.      Tenderness: There is no abdominal tenderness.   Skin:     General: Skin is warm.   Neurological:      Mental Status: Alert and oriented to person, place, and time.      Deep Tendon Reflexes: Reflexes are normal and symmetric.         Procedures  3/2024  Interpretation Summary         A normal monitor study.    Mary Early monitored for 7d 2h, analyzable time was 6d 21h starting on 02/28/24 3:33 PM.  Primary rhythm was Sinus Rhythm.  The average heart rate, excluding ectopy, was 90 BPM, Minium heart rate was 69 BPM  on Day 3 and a maximum of 139 BPM on Day 4. SVE: [place] was <0.01%, 74 total SVE(s). SVT:1 events, longest event 3 beats on Day 2, fastest  event 121 BPM on Day 2. PVC: [place] was <0.1%, 19 total PVC(s). 2 disparate morphologies. VT: 1 events, longest even 3 beats at day 7.Fastest event 158 bpm at day 7.    NSR, OCCASIONAL PACS AND PVCS    5/2024  Interpretation Summary         Findings consistent with a normal ECG stress test.    Myocardial perfusion imaging indicates a normal myocardial perfusion study with no evidence of ischemia. Impressions are consistent with a low risk study.    Left ventricular ejection fraction is normal (Calculated EF = 60%).    Moderate risk for ischemic heart disease.    Sub optimal study , please review the data with caution     Asymptomatic for chest pain. ECG is negative for ischemia.   Ectopy: baseline rhythm is sinus tachycardia, , no ectopy  B/P is appropriate. Baseline: 118/79,Peak 181/55, Recovery 163//71  Exercise tolerance is fair, Reached 18 of 20 Elham Scale.  Muniz treadmill score 3.3  Estimates  5-year survival of  95 % . Using the Duke  score there is a  medium risk  of angiographic coronary disease     Supervised by: Dr. Bentley    2/2024   Interpretation Summary         Left ventricular ejection fraction appears to be 56 - 60%.    Left ventricular diastolic function was normal.    Mild dilation of the aortic root is present. Mild dilation of the sinuses of Valsalva is present.         Current Outpatient Medications:     acetaminophen (TYLENOL) 500 MG tablet, , Disp: , Rfl:     amLODIPine-benazepril (LOTREL) 10-20 MG per capsule, Take 1 capsule by mouth Daily., Disp: , Rfl:     B Complex-C-E-Zn (b complex-C-E-zinc) tablet, Take 1 tablet by mouth Daily. HOLD PER MD INSTR, Disp: , Rfl:     baclofen (LIORESAL) 10 MG tablet, TAKE 1 TABLET BY MOUTH THREE TIMES DAILY AS NEEDED FOR MUSCLE SPASMS. MAY CAUSE DROWSINESS, Disp: , Rfl:     benzonatate (TESSALON) 200 MG capsule, , Disp: , Rfl:     cephalexin (KEFLEX) 250 MG capsule, , Disp: , Rfl:     cholecalciferol (VITAMIN D3) 25 MCG (1000 UT) tablet, Take 1 tablet by mouth Daily., Disp: , Rfl:     esomeprazole (nexIUM) 40 MG capsule, , Disp: , Rfl:     Estradiol-Norethindrone Acet 0.5-0.1 MG per tablet, Take 1 tablet by mouth Daily., Disp: , Rfl:     fluconazole (DIFLUCAN) 100 MG tablet, , Disp: , Rfl:     HYDROcodone-acetaminophen (NORCO)  MG per tablet, Take 1 tablet by mouth Every 4 (Four) Hours As Needed for Moderate Pain., Disp: 40 tablet, Rfl: 0    levocetirizine (XYZAL) 5 MG tablet, Take 1 tablet by mouth Every Evening., Disp: , Rfl:     loratadine-pseudoephedrine (CLARITIN-D 12-hour) 5-120 MG per 12 hr tablet, , Disp: , Rfl:     naloxone (NARCAN) 4 MG/0.1ML nasal spray, CALL 911. Do not prime. Spray into nostril upon signs of opioid overdose. May repeat in 2 to 3 minutes in opposite nostril if no or minimal breathing and responsiveness, then as needed (if doses are available) every 2 to 3 minutes., Disp: 2 each, Rfl: 0    ondansetron ODT (ZOFRAN-ODT) 8 MG disintegrating tablet, Place 1  tablet on the tongue Every 8 (Eight) Hours As Needed for Nausea or Vomiting., Disp: 15 tablet, Rfl: 0    propranolol (INDERAL) 10 MG tablet, , Disp: , Rfl:     sucralfate (CARAFATE) 1 g tablet, Take 1 tablet by mouth 4 (Four) Times a Day., Disp: 20 tablet, Rfl: 0    Toviaz 4 MG tablet sustained-release 24 hour tablet, , Disp: , Rfl:     triamterene-hydrochlorothiazide (DYAZIDE) 37.5-25 MG per capsule, Take 1 capsule by mouth Daily., Disp: , Rfl:     venlafaxine XR (EFFEXOR-XR) 150 MG 24 hr capsule, Take 225 mg by mouth Daily., Disp: , Rfl:     Ventolin  (90 Base) MCG/ACT inhaler, Ventolin HFA 90 mcg/actuation aerosol inhaler, Disp: , Rfl:      Assessment:        Patient Active Problem List   Diagnosis    Pericardial effusion    Abnormal CT of the chest    Primary hypertension               Plan:   1.  History of family heart disease with chest pressure/shortness of breath.  Test results: Stress test indicated a normal study.      It was explained to the patient that stress testing carries 85% specificity/sensitivity, and does not rule out future cardiac event.      2.  Hyperlipidemia: She follows a primary care physician monitor her cholesterol labs.    Risk of the hyperlipidemia, importance of the treatment has been explained. Pros and cons of the statins has been explained. Regular blood workup as well as side effects including the liver failure, myelopathy death has been explained.               No diagnosis found.    There are no diagnoses linked to this encounter.    COUNSELING: faith Lamberteling was given to patient for the following topics: diagnostic results, risk factor reductions, impressions, risks and benefits of treatment options and importance of treatment compliance .       SMOKING COUNSELING: denies      Refer to pulm.   Follow up in 6 months, unless she needs to be seen sooner.    Sincerely,   MIREYA Cook  [facility]  05/29/24  10:38 EDT    EMR  Dragon/Transcription disclaimer: Dictated utilizing Dragon Dictation

## 2024-11-19 ENCOUNTER — OFFICE VISIT (OUTPATIENT)
Dept: CARDIOLOGY | Facility: CLINIC | Age: 54
End: 2024-11-19
Payer: COMMERCIAL

## 2024-11-19 VITALS
WEIGHT: 249 LBS | DIASTOLIC BLOOD PRESSURE: 80 MMHG | HEIGHT: 62 IN | HEART RATE: 73 BPM | BODY MASS INDEX: 45.82 KG/M2 | SYSTOLIC BLOOD PRESSURE: 124 MMHG

## 2024-11-19 DIAGNOSIS — E78.5 HYPERLIPIDEMIA, UNSPECIFIED HYPERLIPIDEMIA TYPE: ICD-10-CM

## 2024-11-19 DIAGNOSIS — I77.810 MILD DILATION OF ASCENDING AORTA: Primary | ICD-10-CM

## 2024-11-19 DIAGNOSIS — I10 PRIMARY HYPERTENSION: ICD-10-CM

## 2024-11-19 PROCEDURE — 99214 OFFICE O/P EST MOD 30 MIN: CPT

## 2024-11-19 PROCEDURE — 3079F DIAST BP 80-89 MM HG: CPT

## 2024-11-19 PROCEDURE — 3074F SYST BP LT 130 MM HG: CPT

## 2024-11-19 NOTE — PROGRESS NOTES
Subjective:        Mary Early is a 54 y.o. female who here for follow up    Chief Complaint   Patient presents with    Follow-up     6MO       HPI    This is a 54-year-old female who is new with this provider with hypertension, hyperlipidemia, seen in office today for 6-month follow-up.    Echo 2/20/2024 normal EF and LV function.  Mild dilation of the aortic root.  Stress test 5/23/2024 was a normal myocardial perfusion study no evidence of ischemia.  Holter monitor 3724 normal sinus rhythm with occasional PACs and PVCs.    The following portions of the patient's history were reviewed and updated as appropriate: allergies, current medications, past family history, past medical history, past social history, past surgical history and problem list.    Past Medical History:   Diagnosis Date    Anemia     Anxiety     Arthritis     Depression     Headache, tension-type     Hyperlipidemia     Hypertension          reports that she has never smoked. She has never used smokeless tobacco. She reports current alcohol use. She reports that she does not use drugs.     Family History   Problem Relation Age of Onset    Arthritis Mother     Alcohol abuse Mother     Heart disease Mother     Hypertension Mother     Hyperlipidemia Mother     Obesity Mother     Stroke Mother     Thyroid disease Mother     Alcohol abuse Father     Hypertension Father     Malig Hyperthermia Neg Hx        ROS     Review of Systems  Constitutional: No wt loss, fever, fatigue  Gastrointestinal: No nausea, abdominal pain  Behavioral/Psych: No insomnia or anxiety  Cardiovascular no chest pain or shortness of breath      Objective:           Vitals and nursing note reviewed.   Constitutional:       Appearance: Well-developed.   HENT:      Head: Normocephalic.      Right Ear: External ear normal.      Left Ear: External ear normal.   Neck:      Vascular: No JVD.   Pulmonary:      Effort: Pulmonary effort is normal. No respiratory distress.      Breath  sounds: Normal breath sounds. No stridor. No rales.   Cardiovascular:      Normal rate. Regular rhythm.      No gallop.    Pulses:     Intact distal pulses.   Edema:     Peripheral edema absent.   Abdominal:      General: Bowel sounds are normal. There is no distension.      Palpations: Abdomen is soft.      Tenderness: There is no abdominal tenderness. There is no guarding.   Musculoskeletal: Normal range of motion.         General: No tenderness.      Cervical back: Normal range of motion. Skin:     General: Skin is warm.   Neurological:      Mental Status: Alert and oriented to person, place, and time.      Deep Tendon Reflexes: Reflexes are normal and symmetric.   Psychiatric:         Judgment: Judgment normal.         Procedures    Interpretation Summary         A normal monitor study.    Mary Early monitored for 7d 2h, analyzable time was 6d 21h starting on 02/28/24 3:33 PM.  Primary rhythm was Sinus Rhythm.  The average heart rate, excluding ectopy, was 90 BPM, Minium heart rate was 69 BPM  on Day 3 and a maximum of 139 BPM on Day 4. SVE: [place] was <0.01%, 74 total SVE(s). SVT:1 events, longest event 3 beats on Day 2, fastest  event 121 BPM on Day 2. PVC: [place] was <0.1%, 19 total PVC(s). 2 disparate morphologies. VT: 1 events, longest even 3 beats at day 7.Fastest event 158 bpm at day 7.    NSR, OCCASIONAL PACS AND PVCS    Interpretation Summary         Findings consistent with a normal ECG stress test.    Myocardial perfusion imaging indicates a normal myocardial perfusion study with no evidence of ischemia. Impressions are consistent with a low risk study.    Left ventricular ejection fraction is normal (Calculated EF = 60%).    Moderate risk for ischemic heart disease.    Sub optimal study , please review the data with caution    Interpretation Summary         Left ventricular ejection fraction appears to be 56 - 60%.    Left ventricular diastolic function was normal.    Mild dilation of the aortic  root is present. Mild dilation of the sinuses of Valsalva is present.         Current Outpatient Medications:     acetaminophen (TYLENOL) 500 MG tablet, , Disp: , Rfl:     amLODIPine-benazepril (LOTREL) 10-20 MG per capsule, Take 1 capsule by mouth Daily., Disp: , Rfl:     B Complex-C-E-Zn (b complex-C-E-zinc) tablet, Take 1 tablet by mouth Daily. HOLD PER MD INSTR, Disp: , Rfl:     baclofen (LIORESAL) 10 MG tablet, TAKE 1 TABLET BY MOUTH THREE TIMES DAILY AS NEEDED FOR MUSCLE SPASMS. MAY CAUSE DROWSINESS, Disp: , Rfl:     benzonatate (TESSALON) 200 MG capsule, , Disp: , Rfl:     cephalexin (KEFLEX) 250 MG capsule, , Disp: , Rfl:     cholecalciferol (VITAMIN D3) 25 MCG (1000 UT) tablet, Take 1 tablet by mouth Daily., Disp: , Rfl:     esomeprazole (nexIUM) 40 MG capsule, , Disp: , Rfl:     Estradiol-Norethindrone Acet 0.5-0.1 MG per tablet, Take 1 tablet by mouth Daily., Disp: , Rfl:     fluconazole (DIFLUCAN) 100 MG tablet, , Disp: , Rfl:     HYDROcodone-acetaminophen (NORCO)  MG per tablet, Take 1 tablet by mouth Every 4 (Four) Hours As Needed for Moderate Pain., Disp: 40 tablet, Rfl: 0    levocetirizine (XYZAL) 5 MG tablet, Take 1 tablet by mouth Every Evening., Disp: , Rfl:     loratadine-pseudoephedrine (CLARITIN-D 12-hour) 5-120 MG per 12 hr tablet, , Disp: , Rfl:     naloxone (NARCAN) 4 MG/0.1ML nasal spray, CALL 911. Do not prime. Spray into nostril upon signs of opioid overdose. May repeat in 2 to 3 minutes in opposite nostril if no or minimal breathing and responsiveness, then as needed (if doses are available) every 2 to 3 minutes., Disp: 2 each, Rfl: 0    ondansetron ODT (ZOFRAN-ODT) 8 MG disintegrating tablet, Place 1 tablet on the tongue Every 8 (Eight) Hours As Needed for Nausea or Vomiting., Disp: 15 tablet, Rfl: 0    propranolol (INDERAL) 10 MG tablet, , Disp: , Rfl:     sucralfate (CARAFATE) 1 g tablet, Take 1 tablet by mouth 4 (Four) Times a Day., Disp: 20 tablet, Rfl: 0    Toviaz 4 MG tablet  sustained-release 24 hour tablet, , Disp: , Rfl:     triamterene-hydrochlorothiazide (DYAZIDE) 37.5-25 MG per capsule, Take 1 capsule by mouth Daily., Disp: , Rfl:     venlafaxine XR (EFFEXOR-XR) 150 MG 24 hr capsule, Take 225 mg by mouth Daily., Disp: , Rfl:     Ventolin  (90 Base) MCG/ACT inhaler, Ventolin HFA 90 mcg/actuation aerosol inhaler, Disp: , Rfl:      Assessment:        Patient Active Problem List   Diagnosis    Pericardial effusion    Abnormal CT of the chest    Primary hypertension               Plan:   1.  Hypertension: well controlled, continue amlodipine/benazepril, triamterene/hctz    Importance of controlling hypertension and blood pressure  checkup on the regular basis has been explained. Hypertension as a silent killer has been discussed. Risk reduction of the weight and regular exercises to control the hypertension has been explained.    2.  Hyperlipidemia: she reports her pcp manages her cholesterol and labs, she was taken off aterovastatin for myalgias.     Risk of the hyperlipidemia, importance of the treatment has been explained. Pros and cons of the statins has been explained. Regular blood workup as well as side effects including the liver failure, myelopathy death has been explained.    3. Echo with mild dilatation of the aortic root: echo in 1 year             No diagnosis found.    There are no diagnoses linked to this encounter.    COUNSELING:    Mary Dozier was given to patient for the following topics: diagnostic results, risk factor reductions, impressions, risks and benefits of treatment options and importance of treatment compliance .       SMOKING COUNSELIN year or sooner if needed     Sincerely,   MIREYA Agrawal  [facility]  24  14:04 EST    EMR Dragon/Transcription disclaimer:   Much of this encounter note is an electronic transcription/translation of spoken language to printed text. The electronic translation of  spoken language may permit erroneous, or at times, nonsensical words or phrases to be inadvertently transcribed; Although I have reviewed the note for such errors, some may still exist.

## 2025-03-05 ENCOUNTER — HOSPITAL ENCOUNTER (OUTPATIENT)
Dept: CT IMAGING | Facility: HOSPITAL | Age: 55
Discharge: HOME | End: 2025-03-05
Admitting: NURSE PRACTITIONER
Payer: COMMERCIAL

## 2025-03-05 DIAGNOSIS — I77.810 MILD ASCENDING AORTA DILATION: ICD-10-CM

## 2025-03-05 PROCEDURE — 71260 CT THORAX DX C+: CPT

## 2025-03-05 PROCEDURE — 25510000001 IOPAMIDOL 61 % SOLUTION: Performed by: NURSE PRACTITIONER

## 2025-03-05 RX ORDER — IOPAMIDOL 612 MG/ML
100 INJECTION, SOLUTION INTRAVASCULAR
Status: COMPLETED | OUTPATIENT
Start: 2025-03-05 | End: 2025-03-05

## 2025-03-05 RX ADMIN — IOPAMIDOL 75 ML: 612 INJECTION, SOLUTION INTRAVENOUS at 09:16

## 2025-03-31 NOTE — PROGRESS NOTES
Subjective:        Mary Early is a 54 y.o. female who here for follow up    Chief Complaint   Patient presents with    Follow-up     TEST RESULT       HPI    This is a 54-year-old female who is known with this provider with hypertension, hyperlipidemia, seen in office today in follow up from CT scan. CT chest with IV contrast with ectasia of the ascending aorta measuring 3.8cm. Feels well, no chest pain or shortness of breath.     Echo 2/20/2024 normal EF and LV function.  Mild dilation of the aortic root.      Stress test 5/23/2024 was a normal myocardial perfusion study no evidence of ischemia.      Holter monitor 3724 normal sinus rhythm with occasional PACs and PVCs.    The following portions of the patient's history were reviewed and updated as appropriate: allergies, current medications, past family history, past medical history, past social history, past surgical history and problem list.    Past Medical History:   Diagnosis Date    Anemia     Anxiety     Arthritis     Depression     Headache, tension-type     Hyperlipidemia     Hypertension          reports that she has never smoked. She has never used smokeless tobacco. She reports current alcohol use. She reports that she does not use drugs.     Family History   Problem Relation Age of Onset    Arthritis Mother     Alcohol abuse Mother     Heart disease Mother     Hypertension Mother     Hyperlipidemia Mother     Obesity Mother     Stroke Mother     Thyroid disease Mother     Alcohol abuse Father     Hypertension Father     Malig Hyperthermia Neg Hx            Objective:           Vitals and nursing note reviewed.   Constitutional:       Appearance: Well-developed.   HENT:      Head: Normocephalic.      Right Ear: External ear normal.      Left Ear: External ear normal.   Neck:      Vascular: No JVD.   Pulmonary:      Effort: Pulmonary effort is normal. No respiratory distress.      Breath sounds: Normal breath sounds. No stridor. No rales.    Cardiovascular:      Normal rate. Regular rhythm.      No gallop.    Pulses:     Intact distal pulses.   Edema:     Peripheral edema absent.   Abdominal:      General: Bowel sounds are normal. There is no distension.      Palpations: Abdomen is soft.      Tenderness: There is no abdominal tenderness. There is no guarding.   Musculoskeletal: Normal range of motion.         General: No tenderness.      Cervical back: Normal range of motion. Skin:     General: Skin is warm.   Neurological:      Mental Status: Alert and oriented to person, place, and time.      Deep Tendon Reflexes: Reflexes are normal and symmetric.   Psychiatric:         Judgment: Judgment normal.         Procedures    Interpretation Summary         A normal monitor study.    Mary Early monitored for 7d 2h, analyzable time was 6d 21h starting on 02/28/24 3:33 PM.  Primary rhythm was Sinus Rhythm.  The average heart rate, excluding ectopy, was 90 BPM, Minium heart rate was 69 BPM  on Day 3 and a maximum of 139 BPM on Day 4. SVE: [place] was <0.01%, 74 total SVE(s). SVT:1 events, longest event 3 beats on Day 2, fastest  event 121 BPM on Day 2. PVC: [place] was <0.1%, 19 total PVC(s). 2 disparate morphologies. VT: 1 events, longest even 3 beats at day 7.Fastest event 158 bpm at day 7.    NSR, OCCASIONAL PACS AND PVCS    Interpretation Summary         Findings consistent with a normal ECG stress test.    Myocardial perfusion imaging indicates a normal myocardial perfusion study with no evidence of ischemia. Impressions are consistent with a low risk study.    Left ventricular ejection fraction is normal (Calculated EF = 60%).    Moderate risk for ischemic heart disease.    Sub optimal study , please review the data with caution    Interpretation Summary         Left ventricular ejection fraction appears to be 56 - 60%.    Left ventricular diastolic function was normal.    Mild dilation of the aortic root is present. Mild dilation of the sinuses of  Valsalva is present.         Current Outpatient Medications:     acetaminophen (TYLENOL) 500 MG tablet, , Disp: , Rfl:     amLODIPine-benazepril (LOTREL) 10-20 MG per capsule, Take 1 capsule by mouth Daily., Disp: , Rfl:     B Complex-C-E-Zn (b complex-C-E-zinc) tablet, Take 1 tablet by mouth Daily. HOLD PER MD INSTR, Disp: , Rfl:     baclofen (LIORESAL) 10 MG tablet, TAKE 1 TABLET BY MOUTH THREE TIMES DAILY AS NEEDED FOR MUSCLE SPASMS. MAY CAUSE DROWSINESS, Disp: , Rfl:     benzonatate (TESSALON) 200 MG capsule, , Disp: , Rfl:     cephalexin (KEFLEX) 250 MG capsule, , Disp: , Rfl:     cholecalciferol (VITAMIN D3) 25 MCG (1000 UT) tablet, Take 1 tablet by mouth Daily., Disp: , Rfl:     esomeprazole (nexIUM) 40 MG capsule, , Disp: , Rfl:     estradiol (ESTRACE) 1 MG tablet, , Disp: , Rfl:     Estradiol-Norethindrone Acet 0.5-0.1 MG per tablet, Take 1 tablet by mouth Daily., Disp: , Rfl:     fluconazole (DIFLUCAN) 100 MG tablet, , Disp: , Rfl:     HYDROcodone-acetaminophen (NORCO)  MG per tablet, Take 1 tablet by mouth Every 4 (Four) Hours As Needed for Moderate Pain., Disp: 40 tablet, Rfl: 0    levocetirizine (XYZAL) 5 MG tablet, Take 1 tablet by mouth Every Evening., Disp: , Rfl:     loratadine-pseudoephedrine (CLARITIN-D 12-hour) 5-120 MG per 12 hr tablet, , Disp: , Rfl:     naloxone (NARCAN) 4 MG/0.1ML nasal spray, CALL 911. Do not prime. Spray into nostril upon signs of opioid overdose. May repeat in 2 to 3 minutes in opposite nostril if no or minimal breathing and responsiveness, then as needed (if doses are available) every 2 to 3 minutes., Disp: 2 each, Rfl: 0    ondansetron ODT (ZOFRAN-ODT) 8 MG disintegrating tablet, Place 1 tablet on the tongue Every 8 (Eight) Hours As Needed for Nausea or Vomiting., Disp: 15 tablet, Rfl: 0    pantoprazole (PROTONIX) 40 MG EC tablet, , Disp: , Rfl:     propranolol (INDERAL) 10 MG tablet, , Disp: , Rfl:     sertraline (ZOLOFT) 25 MG tablet, , Disp: , Rfl:      sucralfate (CARAFATE) 1 g tablet, Take 1 tablet by mouth 4 (Four) Times a Day., Disp: 20 tablet, Rfl: 0    Toviaz 4 MG tablet sustained-release 24 hour tablet, , Disp: , Rfl:     triamterene-hydrochlorothiazide (DYAZIDE) 37.5-25 MG per capsule, Take 1 capsule by mouth Daily., Disp: , Rfl:     Ventolin  (90 Base) MCG/ACT inhaler, Ventolin HFA 90 mcg/actuation aerosol inhaler, Disp: , Rfl:     Vraylar 1.5 MG capsule capsule, , Disp: , Rfl:      Assessment:        Patient Active Problem List   Diagnosis    Pericardial effusion    Abnormal CT of the chest    Primary hypertension               Plan:   1.  Hypertension: BP today in office 134/87, controlled, continue amlodipine/benazepril, dyazide.     Importance of controlling hypertension and blood pressure  checkup on the regular basis has been explained. Hypertension as a silent killer has been discussed. Risk reduction of the weight and regular exercises to control the hypertension has been explained.    2.  Hyperlipidemia: she reports her pcp manages her cholesterol and labs    Risk of the hyperlipidemia, importance of the treatment has been explained. Pros and cons of the statins has been explained. Regular blood workup as well as side effects including the liver failure, myelopathy death has been explained.    3. Ectasia ascending thoracic aorta: Ct chest 3.8cm, normal thoracic aorta noted per CTA chest report in october, will repeat ct scan in 3mos for monitoring. Echo scheduled in November.              No diagnosis found.    There are no diagnoses linked to this encounter.    COUNSELING: faith Dozier was given to patient for the following topics: diagnostic results, risk factor reductions, impressions, risks and benefits of treatment options and importance of treatment compliance .       SMOKING COUNSELING: denies    Keep appt in November, CT chest in 3mos, I will call with results of CT.     Sincerely,   Sarah Mcgarry,  MIREYA  [facility]  04/01/25  11:28 EDT    EMR Dragon/Transcription disclaimer:   Much of this encounter note is an electronic transcription/translation of spoken language to printed text. The electronic translation of spoken language may permit erroneous, or at times, nonsensical words or phrases to be inadvertently transcribed; Although I have reviewed the note for such errors, some may still exist.

## 2025-04-01 ENCOUNTER — OFFICE VISIT (OUTPATIENT)
Dept: CARDIOLOGY | Facility: CLINIC | Age: 55
End: 2025-04-01
Payer: COMMERCIAL

## 2025-04-01 VITALS
DIASTOLIC BLOOD PRESSURE: 87 MMHG | HEIGHT: 62 IN | SYSTOLIC BLOOD PRESSURE: 134 MMHG | WEIGHT: 257 LBS | BODY MASS INDEX: 47.29 KG/M2 | HEART RATE: 83 BPM

## 2025-04-01 DIAGNOSIS — I10 PRIMARY HYPERTENSION: ICD-10-CM

## 2025-04-01 DIAGNOSIS — I77.810 MILD DILATION OF ASCENDING AORTA: Primary | ICD-10-CM

## 2025-04-01 DIAGNOSIS — E78.5 HYPERLIPIDEMIA, UNSPECIFIED HYPERLIPIDEMIA TYPE: ICD-10-CM

## 2025-04-01 PROCEDURE — 99214 OFFICE O/P EST MOD 30 MIN: CPT

## 2025-04-01 PROCEDURE — 3075F SYST BP GE 130 - 139MM HG: CPT

## 2025-04-01 PROCEDURE — 3079F DIAST BP 80-89 MM HG: CPT

## 2025-04-01 RX ORDER — SERTRALINE HYDROCHLORIDE 25 MG/1
TABLET, FILM COATED ORAL
COMMUNITY

## 2025-04-01 RX ORDER — PANTOPRAZOLE SODIUM 40 MG/1
TABLET, DELAYED RELEASE ORAL
COMMUNITY

## 2025-04-01 RX ORDER — CARIPRAZINE 1.5 MG/1
CAPSULE, GELATIN COATED ORAL
COMMUNITY
Start: 2024-12-03

## 2025-04-01 RX ORDER — ESTRADIOL 1 MG/1
TABLET ORAL
COMMUNITY
Start: 2025-03-31

## 2025-07-01 ENCOUNTER — HOSPITAL ENCOUNTER (OUTPATIENT)
Dept: CT IMAGING | Facility: HOSPITAL | Age: 55
Discharge: HOME | End: 2025-07-01
Payer: COMMERCIAL

## 2025-07-01 DIAGNOSIS — I77.810 MILD DILATION OF ASCENDING AORTA: ICD-10-CM

## 2025-07-01 PROCEDURE — 71260 CT THORAX DX C+: CPT

## 2025-07-01 PROCEDURE — 25510000001 IOPAMIDOL 61 % SOLUTION

## 2025-07-01 RX ORDER — IOPAMIDOL 612 MG/ML
85 INJECTION, SOLUTION INTRAVASCULAR
Status: COMPLETED | OUTPATIENT
Start: 2025-07-01 | End: 2025-07-01

## 2025-07-01 RX ADMIN — IOPAMIDOL 75 ML: 612 INJECTION, SOLUTION INTRAVENOUS at 12:28

## (undated) DEVICE — SOL ISO/ALC 70PCT 4OZ

## (undated) DEVICE — CAUTERY TIP POLISHER: Brand: DEVON

## (undated) DEVICE — TRAP FLD MINIVAC MEGADYNE 100ML

## (undated) DEVICE — DRAPE,U/ SHT,SPLIT,PLAS,STERIL: Brand: MEDLINE

## (undated) DEVICE — DRAPE,REIN 53X77,STERILE: Brand: MEDLINE

## (undated) DEVICE — DUAL CUT SAGITTAL BLADE

## (undated) DEVICE — HANDPIECE SET WITH COAXIAL HIGH FLOW TIP AND SUCTION TUBE: Brand: INTERPULSE

## (undated) DEVICE — PCH SURG INVISISHIELD FLD/COL W/DRN/PRT 20X6IN

## (undated) DEVICE — 18GX6" QUINCKE SPINAL NEEDLE: Brand: MEDLINE

## (undated) DEVICE — GUIDEPIN AEQUALIS PERFORM PLS 2.5X220MM

## (undated) DEVICE — SUT VIC 0 CT1 CR8 18IN J840D

## (undated) DEVICE — APPL CHLORAPREP HI/LITE 26ML ORNG

## (undated) DEVICE — SUT VIC 2/0 CT1 CR8 18IN J839D

## (undated) DEVICE — DRSNG SLVR/ANTIBAC PRIMASEAL POST/OP ADHS 3.5X10IN

## (undated) DEVICE — ADAPT POSTN SUP ARM TRIMANO FORTIS

## (undated) DEVICE — BIT DRL SCRW PERIPH 3.2MM

## (undated) DEVICE — WEREWOLF FASTSEAL 6.0 HEMOSTASIS WAND: Brand: FASTSEAL 6.0 HEMOSTASIS WAND

## (undated) DEVICE — KT POSTN ARM TRIMANO BEACH CHR W/DRP

## (undated) DEVICE — GLV SURG SIGNATURE ESSENTIAL PF LTX SZ8

## (undated) DEVICE — POOLE SUCTION HANDLE: Brand: CARDINAL HEALTH

## (undated) DEVICE — GLV SURG SENSICARE PI LF PF 7.5 GRN STRL

## (undated) DEVICE — GLV SURG PREMIERPRO ORTHO LTX PF SZ7.5 BRN

## (undated) DEVICE — TBG PENCL TELESCP MEGADYNE SMOKE EVAC 10FT

## (undated) DEVICE — 450 ML BOTTLE OF 0.05% CHLORHEXIDINE GLUCONATE IN 99.95% STERILE WATER FOR IRRIGATION, USP AND APPLICATOR.: Brand: IRRISEPT ANTIMICROBIAL WOUND LAVAGE

## (undated) DEVICE — SHEET, DRAPE, SPLIT, STERILE: Brand: MEDLINE

## (undated) DEVICE — MAT FLR ABSORBENT LG 4FT 10 2.5FT

## (undated) DEVICE — 3M™ IOBAN™ 2 ANTIMICROBIAL INCISE DRAPE 6640EZ: Brand: IOBAN™ 2

## (undated) DEVICE — PK SHLDR OPN 40

## (undated) DEVICE — ADHS SKIN SURG TISS VISC PREMIERPRO EXOFIN HI/VISC FAST/DRY

## (undated) DEVICE — ANTIBACTERIAL UNDYED BRAIDED (POLYGLACTIN 910), SYNTHETIC ABSORBABLE SUTURE: Brand: COATED VICRYL

## (undated) DEVICE — SOL IRR NACL 0.9PCT 3000ML

## (undated) DEVICE — GUIDEPIN SIMPLICITI 3X75MM STRL